# Patient Record
Sex: MALE | Race: WHITE | Employment: FULL TIME | URBAN - METROPOLITAN AREA
[De-identification: names, ages, dates, MRNs, and addresses within clinical notes are randomized per-mention and may not be internally consistent; named-entity substitution may affect disease eponyms.]

---

## 2022-05-24 ENCOUNTER — APPOINTMENT (OUTPATIENT)
Dept: CT IMAGING | Age: 32
End: 2022-05-24
Payer: COMMERCIAL

## 2022-05-24 ENCOUNTER — HOSPITAL ENCOUNTER (EMERGENCY)
Age: 32
Discharge: HOME | End: 2022-05-24
Payer: COMMERCIAL

## 2022-05-24 VITALS
BODY MASS INDEX: 25.2 KG/M2 | HEIGHT: 70 IN | HEART RATE: 96 BPM | OXYGEN SATURATION: 98 % | TEMPERATURE: 98.1 F | SYSTOLIC BLOOD PRESSURE: 149 MMHG | RESPIRATION RATE: 16 BRPM | DIASTOLIC BLOOD PRESSURE: 91 MMHG | WEIGHT: 176 LBS

## 2022-05-24 DIAGNOSIS — S09.90XA CLOSED HEAD INJURY, INITIAL ENCOUNTER: Primary | ICD-10-CM

## 2022-05-24 DIAGNOSIS — T07.XXXA ABRASIONS OF MULTIPLE SITES: ICD-10-CM

## 2022-05-24 PROCEDURE — 70450 CT HEAD/BRAIN W/O DYE: CPT

## 2022-05-24 PROCEDURE — 99284 EMERGENCY DEPT VISIT MOD MDM: CPT

## 2022-05-24 ASSESSMENT — PAIN SCALES - GENERAL: PAINLEVEL_OUTOF10: 3

## 2022-05-24 ASSESSMENT — PAIN - FUNCTIONAL ASSESSMENT: PAIN_FUNCTIONAL_ASSESSMENT: 0-10

## 2022-05-25 NOTE — ED NOTES
Pt instructed to follow up with PCP for wound recheck. Assessed per Marvin Carpenter LPN  17/55/88 8161

## 2022-05-25 NOTE — ED NOTES
Pt left ear wound applied with Petroleum adhesive/gauze turban wrap applied to keep dressing intact/PSO/adaptic/gauze/kerelx wrap applied to left hand.       Ford Stroud LPN  51/02/14 5210

## 2022-09-02 ENCOUNTER — APPOINTMENT (OUTPATIENT)
Dept: CT IMAGING | Age: 32
End: 2022-09-02
Payer: COMMERCIAL

## 2022-09-02 ENCOUNTER — HOSPITAL ENCOUNTER (EMERGENCY)
Age: 32
Discharge: HOME | End: 2022-09-02
Payer: COMMERCIAL

## 2022-09-02 VITALS
RESPIRATION RATE: 16 BRPM | TEMPERATURE: 98.6 F | DIASTOLIC BLOOD PRESSURE: 80 MMHG | OXYGEN SATURATION: 99 % | BODY MASS INDEX: 24.34 KG/M2 | WEIGHT: 170 LBS | SYSTOLIC BLOOD PRESSURE: 133 MMHG | HEIGHT: 70 IN | HEART RATE: 81 BPM

## 2022-09-02 DIAGNOSIS — K42.9 UMBILICAL HERNIA WITHOUT OBSTRUCTION AND WITHOUT GANGRENE: Primary | ICD-10-CM

## 2022-09-02 DIAGNOSIS — R10.33 PERIUMBILICAL ABDOMINAL PAIN: ICD-10-CM

## 2022-09-02 LAB
A/G RATIO: 2 (ref 1.1–2.2)
ALBUMIN SERPL-MCNC: 5 G/DL (ref 3.4–5)
ALP BLD-CCNC: 81 U/L (ref 40–129)
ALT SERPL-CCNC: 13 U/L (ref 10–40)
ANION GAP SERPL CALCULATED.3IONS-SCNC: 10 MMOL/L (ref 3–16)
AST SERPL-CCNC: 14 U/L (ref 15–37)
BASOPHILS ABSOLUTE: 0 K/UL (ref 0–0.2)
BASOPHILS RELATIVE PERCENT: 0.5 %
BILIRUB SERPL-MCNC: 0.6 MG/DL (ref 0–1)
BUN BLDV-MCNC: 11 MG/DL (ref 7–20)
CALCIUM SERPL-MCNC: 9.1 MG/DL (ref 8.3–10.6)
CHLORIDE BLD-SCNC: 102 MMOL/L (ref 99–110)
CO2: 27 MMOL/L (ref 21–32)
CREAT SERPL-MCNC: 0.9 MG/DL (ref 0.9–1.3)
EOSINOPHILS ABSOLUTE: 0.1 K/UL (ref 0–0.6)
EOSINOPHILS RELATIVE PERCENT: 1.5 %
GFR AFRICAN AMERICAN: >60
GFR NON-AFRICAN AMERICAN: >60
GLUCOSE BLD-MCNC: 101 MG/DL (ref 70–99)
HCT VFR BLD CALC: 45.6 % (ref 40.5–52.5)
HEMOGLOBIN: 15.7 G/DL (ref 13.5–17.5)
LIPASE: 23 U/L (ref 13–60)
LYMPHOCYTES ABSOLUTE: 1.8 K/UL (ref 1–5.1)
LYMPHOCYTES RELATIVE PERCENT: 24.1 %
MCH RBC QN AUTO: 31.8 PG (ref 26–34)
MCHC RBC AUTO-ENTMCNC: 34.4 G/DL (ref 31–36)
MCV RBC AUTO: 92.4 FL (ref 80–100)
MONOCYTES ABSOLUTE: 0.4 K/UL (ref 0–1.3)
MONOCYTES RELATIVE PERCENT: 5.1 %
NEUTROPHILS ABSOLUTE: 5.2 K/UL (ref 1.7–7.7)
NEUTROPHILS RELATIVE PERCENT: 68.8 %
PDW BLD-RTO: 13.2 % (ref 12.4–15.4)
PLATELET # BLD: 183 K/UL (ref 135–450)
PMV BLD AUTO: 8.4 FL (ref 5–10.5)
POTASSIUM REFLEX MAGNESIUM: 4 MMOL/L (ref 3.5–5.1)
RBC # BLD: 4.93 M/UL (ref 4.2–5.9)
SODIUM BLD-SCNC: 139 MMOL/L (ref 136–145)
TOTAL PROTEIN: 7.5 G/DL (ref 6.4–8.2)
WBC # BLD: 7.5 K/UL (ref 4–11)

## 2022-09-02 PROCEDURE — 99285 EMERGENCY DEPT VISIT HI MDM: CPT

## 2022-09-02 PROCEDURE — 83690 ASSAY OF LIPASE: CPT

## 2022-09-02 PROCEDURE — 80053 COMPREHEN METABOLIC PANEL: CPT

## 2022-09-02 PROCEDURE — 85025 COMPLETE CBC W/AUTO DIFF WBC: CPT

## 2022-09-02 PROCEDURE — 6360000004 HC RX CONTRAST MEDICATION: Performed by: NURSE PRACTITIONER

## 2022-09-02 PROCEDURE — 74177 CT ABD & PELVIS W/CONTRAST: CPT

## 2022-09-02 RX ADMIN — IOPAMIDOL 75 ML: 755 INJECTION, SOLUTION INTRAVENOUS at 16:16

## 2022-09-02 ASSESSMENT — PAIN DESCRIPTION - LOCATION: LOCATION: ABDOMEN

## 2022-09-02 ASSESSMENT — ENCOUNTER SYMPTOMS
COUGH: 0
VOMITING: 0
DIARRHEA: 0
ABDOMINAL PAIN: 1
SHORTNESS OF BREATH: 0
COLOR CHANGE: 0
BACK PAIN: 0
NAUSEA: 0
WHEEZING: 0

## 2022-09-02 ASSESSMENT — PAIN SCALES - GENERAL
PAINLEVEL_OUTOF10: 0
PAINLEVEL_OUTOF10: 2

## 2022-09-02 ASSESSMENT — PAIN - FUNCTIONAL ASSESSMENT
PAIN_FUNCTIONAL_ASSESSMENT: NONE - DENIES PAIN
PAIN_FUNCTIONAL_ASSESSMENT: 0-10

## 2022-09-02 NOTE — ED PROVIDER NOTES
**ADVANCED PRACTICE PROVIDER, I [facility]AL[facility]  ED  EMERGENCY DEPARTMENT ENCOUNTER      Pt Name: Gaylyn Paget  [facility] [place]:[id number]  [place] 1990  Date of evaluation: 9/2/2022  Provider: JANETH Stephenson CNP      Chief Complaint:    Chief Complaint   Patient presents with    Abdominal Pain     Pt with upper right side tenderness to touch         Nursing Notes, Past Medical Hx, Past Surgical Hx, Social Hx, Allergies, and Family Hx were all reviewed and agreed with or any disagreements were addressed in the HPI.    HPI: (Location, Duration, Timing, Severity, Quality, Assoc Sx, Context, Modifying factors)    Chief Complaint of umbilical abdominal pain    This is a  28 y.o. 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 However he denies any nausea vomiting or diarrhea. No cough, congestion, fever or chills, no chest pain put chest pain or shortness of breath. He states that at rest he does not have any discomfort however when he moves or strains at all the discomfort is about a 4 out of 10. He denies any constipation or blood in stool. He denies any additional complaints. No additional aggravating relieving factors. Patient presents awake, alert and in no acute respiratory distress or toxic appearance    PastMedical/Surgical History:  History reviewed. No pertinent past medical history. History reviewed. No pertinent surgical history. Medications:  Previous Medications    No medications on file         Review of Systems:  (2-9 systems needed)  Review of Systems   Constitutional:  Negative for chills and fever. HENT:  Negative for congestion. Respiratory:  Negative for cough, shortness of breath and wheezing. Cardiovascular:  Negative for chest pain. Gastrointestinal:  Positive for abdominal pain. Negative for diarrhea, nausea and vomiting. 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 However he denies any nausea vomiting or diarrhea. Genitourinary:  Negative for dysuria, frequency, hematuria and urgency. Musculoskeletal:  Negative for back pain. Skin:  Negative for color change. Neurological:  Negative for weakness, numbness and headaches. \"Positives and Pertinent negatives as per HPI\"    Physical Exam:  Physical Exam  Vitals and nursing note reviewed. Constitutional:       Appearance: He is well-developed. He is not diaphoretic. HENT:      Head: Normocephalic. Right Ear: External ear normal.      Left Ear: External ear normal.   Eyes:      General: No scleral icterus. Right eye: No discharge. Left eye: No discharge. Cardiovascular:      Rate and Rhythm: Normal rate. Comments: Normal S1 and 2, peripheral pulses are 2+, no edema observed  Pulmonary:      Effort: Pulmonary effort is normal. No respiratory distress. Comments: Airway patent with symmetric rise and fall of chest, lungs are clear anteriorly and posteriorly, patient is not tachypneic or dyspneic, saturations are 100% on room  Abdominal:      Palpations: Abdomen is soft. Tenderness: abdominal tenderness      Comments: Abdomen is flat soft nondistended. Bowel sounds are active, patient does have what appears to be an umbilical hernia just above the umbilical region as there is a slight bulge there that is tender to touch, I am unable to reduce it. No acute ascites or rigidity. No rebound tenderness at Dane's point, no Rasmussen sign. Musculoskeletal:         General: Normal range of motion. Cervical back: Normal range of motion and neck supple. Skin:     General: Skin is warm. Capillary Refill: Capillary refill takes less than 2 seconds. Coloration: Skin is not pale. Neurological:      General: No focal deficit present. Mental Status: He is alert and oriented to person, place, and time. GCS: GCS eye subscore is 4. GCS verbal subscore is 5. GCS motor subscore is 6. Psychiatric:         Behavior: Behavior normal.       MEDICAL DECISION MAKING    Vitals:    Vitals:    09/02/22 1453 09/02/22 1553 09/02/22 1645 09/02/22 1730   BP: (!) 146/93 (!) 138/96 118/77    Pulse: 84 86 85    Resp: 16 16 16    Temp: 98.8 °F (37.1 °C)      SpO2: 100% 98% 99% 99%   Weight: 170 lb (77.1 kg)          LABS:  Labs Reviewed   COMPREHENSIVE METABOLIC PANEL W/ REFLEX TO MG FOR LOW K - Abnormal; Notable for the following components:       Result Value    Glucose 101 (*)     AST 14 (*)     All other components within normal limits   CBC WITH AUTO DIFFERENTIAL   LIPASE   URINALYSIS WITH REFLEX TO CULTURE        Remainder of labs reviewed and were negative at this time or not returned at the time of this note. RADIOLOGY:   Non-plain film images such as CT, Ultrasound and MRI are read by the radiologist. Flory BAEZ APRN - CNP have directly visualized the radiologic plain film image(s) with the below findings:      Interpretation per the Radiologist below, if available at the time of this note:    CT ABDOMEN PELVIS W IV CONTRAST Additional Contrast? None   Preliminary Result   Tiny fat-filled umbilical hernia with mild adjacent inflammatory stranding. No drainable fluid collection or soft tissue gas. No acute intra-abdominal or intrapelvic abnormality identified.               MEDICAL DECISION MAKING / ED COURSE:    Because of high probability of sudden clinical deterioration of the patient's condition and risk of further deterioration, critical care time required my full attention to the patient's condition; which included chart data review, documentation, medication ordering, reviewing the patient's old records, reevaluation patient's cardiac, pulmonary and neurological status. Reevaluation of vital signs. Consultations with ED attending and admitting physician. Ordering, interpreting reviewing diagnostic testing. Therefore, I personally saw the patient and independently provided 13 minutes of non-concurrent critical care out of the total shared critical care time provided, direct attention to the patient's condition did not include time spent on procedures. 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 However he denies any nausea vomiting or diarrhea. After evaluation and examination the patient IV access, blood work, urinalysis and CT scan the abdomen ordered, patient declined any pain medicine at this time. CBC shows no sepsis or anemia. Metabolic panel shows no electrolyte disturbances or renal insufficiency. Liver functions and lipase are normal.  CT of the abdomen shows a tiny fat filled umbilical hernia with mild trace inflammatory stranding, no drainable fluid collection or soft tissue gas, no acute intra-abdominal or intrapelvic abnormality. I do believe this can be followed up on outpatient basis and do not think the patient needs admission to the hospital.  I educated him about avoiding any strenuous activities or lifting heavy as this could elicit worsening hernia, discomfort, I also believe that the patient can follow-up on outpatient basis with general surgery.   At this time no concerns for acute surgical abdomen, sepsis or other emergent etiology. Therefore, shared medical decision was made between the patient myself and we agreed the patient could be discharged home with outpatient follow-up. Patient was discharged home with referral back to PCP more portly needs to follow-up with general surgery in regards to the bilateral hernia. Educated avoid heavy lifting or strenuous exercises discussed elicit worsening discomfort. Return to the ER for any worsening or concerning symptoms. The patient tolerated their visit well. I evaluated the patient. The physician was available for consultation as needed. The patient and / or the family were informed of the results of any tests, a time was given to answer questions, a plan was proposed and they agreed with plan. Patient verbalized understanding of discharge instructions and was discharged from the department in stable condition. I am the Primary Clinician of Record. CLINICAL IMPRESSION:  1. Umbilical hernia without obstruction and without gangrene    2. Periumbilical abdominal pain        DISPOSITION Decision To Discharge 09/02/2022 05:58:29 PM      PATIENT REFERRED TO:  Duglas Parosns MD  [address] [place] [address]  Maximilian: [address] [place] [id number]      This is a general surgeon I would like you to follow-up with, please call in Monday morning and make an appointment for follow-up.     [facility]  [address] [facility] [address] [phone]  Go to   If symptoms worsen    DISCHARGE MEDICATIONS:  New Prescriptions    No medications on file       DISCONTINUED MEDICATIONS:  Discontinued Medications    No medications on file              (Please note the MDM and HPI sections of this note were completed with a voice recognition program.  Efforts were made to edit the dictations but occasionally words are mis-transcribed.)    Electronically signed, JANETH Ruvalcaba CNP,          JANETH Ruvalcaba CNP  09/02/22 [facility]

## 2022-09-06 ENCOUNTER — TELEPHONE (OUTPATIENT)
Dept: SURGERY | Age: 32
End: 2022-09-06

## 2022-09-06 ENCOUNTER — INITIAL CONSULT (OUTPATIENT)
Dept: SURGERY | Age: 32
End: 2022-09-06
Payer: COMMERCIAL

## 2022-09-06 VITALS
SYSTOLIC BLOOD PRESSURE: 133 MMHG | HEIGHT: 70 IN | WEIGHT: 160.4 LBS | DIASTOLIC BLOOD PRESSURE: 74 MMHG | TEMPERATURE: 98.3 F | HEART RATE: 100 BPM | BODY MASS INDEX: 22.96 KG/M2

## 2022-09-06 DIAGNOSIS — K43.9 EPIGASTRIC HERNIA: Primary | ICD-10-CM

## 2022-09-06 PROCEDURE — 99203 OFFICE O/P NEW LOW 30 MIN: CPT | Performed by: SURGERY

## 2022-09-06 RX ORDER — SODIUM CHLORIDE 9 MG/ML
INJECTION, SOLUTION INTRAVENOUS PRN
Status: CANCELLED | OUTPATIENT
Start: 2022-09-06

## 2022-09-06 RX ORDER — SODIUM CHLORIDE 0.9 % (FLUSH) 0.9 %
5-40 SYRINGE (ML) INJECTION PRN
Status: CANCELLED | OUTPATIENT
Start: 2022-09-06

## 2022-09-06 RX ORDER — SODIUM CHLORIDE 0.9 % (FLUSH) 0.9 %
5-40 SYRINGE (ML) INJECTION EVERY 12 HOURS SCHEDULED
Status: CANCELLED | OUTPATIENT
Start: 2022-09-06

## 2022-09-06 NOTE — TELEPHONE ENCOUNTER
Pt had called and said he would like a revised 'Return to Work' letter stating that he was able to go back to work today after his consult with Dr. Valentin Le 9/6 - 9/21 with no restrictions. He said it would be great if you could email it to him tomorrow sometime 9/7. He would greatly appreciate it! His Email: [email or web address]  Thank you very much!

## 2022-09-06 NOTE — TELEPHONE ENCOUNTER
PT called in stating that him and Dr. Zachery Dhillon decided that he is fine to go back to work prior to surgery. Pt stated that he is having trouble with his work because his paperwork from the ER states he is not able to go back to work till he does has surgery, so he is not sure what to do since Dr. Zachery Dhillon advised him to return to work till surgery. Please call pt when you get a chance.  [phone]

## 2022-09-07 ENCOUNTER — TELEPHONE (OUTPATIENT)
Dept: SURGERY | Age: 32
End: 2022-09-07

## 2022-09-07 NOTE — PATIENT INSTRUCTIONS
[address] [address]  Phone: [phone]  Fax: [phone] will be scheduled for surgery with Dr. Gosia Yi. The office will call you with the date and time that surgery is scheduled. Please take note of these instructions for surgery: You should have nothing by mouth after midnight the night before your surgery - this includes no food or water. Your surgery will be cancelled if you have taken anything by mouth after midnight, NO exceptions. You will need to have a history and physical prior to your surgery. This will need to be completed up to 30 days before your surgery. This H/P can be completed by your family doctor or the hospital.   IF you take coumadin (warfarin), please stop taking this medication 5 days prior to your surgery. IF you take plavix, please stop taking this 7 days prior to your surgery. Please contact our office if you have a pacemaker or defibrillator. IF you are allergic to latex, please tell our office prior to your surgery. This is important in know before scheduling your surgery. IF you are having an out patient surgery, you will need someone available to drive you home after your surgery, and to also stay with you for the rest of the day. IF you are having a surgery requiring an inpatient stay in the hospital, you will need someone to drive you home upon discharge from the hospital.  Please contact Dr. Clinton Staton assistant Shannon Cantor if you have any questions or concerns. Please call the office with any changes in your symptoms or further questions/concerns.  [phone]

## 2022-09-07 NOTE — PROGRESS NOTES
April Sickle    Age 28 y.o.    male    1990    MRN [id number]    9/22/2022  Arrival Time_____________  OR Time____________[address]     Procedure(s):  OPEN EPIGASTRIC HERNIA REPAIR, POSSIBLE MESH                      Monitor Anesthesia Care   Surgeon(s):  Ramon Walker, MD      DAY ADMIT ___  SDS/OP ___  OUTPT IN BED ___        Phone [phone] (home)     PCP _____________________ Phone_________________ Epic ( ) Epic CE ( ) Appt ________    ADDITIONAL INFO __________________________________ Cardio/Consult _____________    NOTES _____________________________________________________________________    ____________________________________________________________________________    PAT APPT DATE:________ TIME: ________  FAXED QAD: _______  (__) H&P w/ Hospitalist  __________________________________________________________________________  Preop Nurse phone screen complete: _____________  (__) CBC     (__) W/ DIFF ___________     (__) Hgb A1C    ___________  (__) CHEST X RAY   __________  (__) LIPID PROFILE  ___________  (__) EKG   __________  (__) PT/PTT   ___________  (__) PFT's   __________  (__) BMP   ___________  (__) CAROTIDS  __________  (__) CMP   ___________  (__) VEIN MAPPING  __________  (__) U/A   ___________  (__) HISTORY & PHYSICAL __________  (__) URINE C & S  ___________  (__) CARDIAC CLEARANCE __________  (__) U/A W/ FLEX  ___________  (__) PULM.  CLEARANCE __________  (__) SERUM PREGNANCY ___________  (__) Check Epic DOS orders __________  (__) TYPE & SCREEN __________repeat ( ) (__)  __________________ __________  (__) ALBUMIN Victor Hugo Bolus ___________  (__)  __________________ __________  (__) TRANSFERRIN  ___________  (__)  __________________ __________  (__) LIVER PROFILE  ___________  (__)  __________________ __________  (__) MRSA NASAL SWAB ___________  (__) URINE PREG DOS __________  (__) SED RATE  ___________  (__) BLOOD SUGAR DOS __________  (__) C-REACTIVE PROTEIN ___________    (__) VITAMIN D HYDROXY ___________  (__) BLOOD THINNERS __________    (__) ACE/ ARBS: _____________________     (__) BETABLOCKERS __________________

## 2022-09-07 NOTE — TELEPHONE ENCOUNTER
[place] Work Comp Claim  DOI: 8/25/22  Claim # [id number]  Pt saw Dr Brennan Winston in the office 9/6/22 and was given surgery instructions for an Open Epigastric Hernia Repair, Possible Mesh (MAC) scheduled 9/22/22 @ 10:30am arrival 8:30am [facility] - NPO after midnight gilda b/f surgery - Pt will need  day of surgery - Pre-op physical needed - Pt understood and agreed w/ above noted

## 2022-09-07 NOTE — PROGRESS NOTES
New Patient [address] Surgery Edson Hands JACOB Horton, [address] [facility], [address] [place] [address], [address] [place] [address]  Phone: [phone]  Fax: [phone]    Kj Marroquin   YOB: 1990    Date of Visit:  9/6/2022    No ref. provider found  No primary care provider on file. HPI:   Hernia: Patient is 28y.o. year old male seen at request of No primary care provider on file. .  Patient presents for evaluation of  epigastric hernia, tender. Symptoms were first noted 2 weeks ago. Pain is sharp, intermittent. There is a lump or mass present just above his umbilicus. Lump is not reducible. Pt does not have risk factors of  chronic constipation, chronic cough, difficulty urinating,but he does smoke daily. Pt. does not have previous history of prior hernia surgery. Patient states he lifted a heavy object at work and felt a sudden sharp pain followed by the lump above the umbilicus. No Known Allergies  No outpatient medications have been marked as taking for the 9/6/22 encounter (Initial consult) with Ulysses Paganini, MD.       No past medical history on file. No past surgical history on file. No family history on file.   Social History     Socioeconomic History    Marital status: Single     Spouse name: Not on file    Number of children: Not on file    Years of education: Not on file    Highest education level: Not on file   Occupational History    Not on file   Tobacco Use    Smoking status: Every Day     Packs/day: 1.00     Types: Cigarettes    Smokeless tobacco: Current     Types: Chew   Substance and Sexual Activity    Alcohol use: Yes     Comment: weekly    Drug use: Never    Sexual activity: Not on file   Other Topics Concern    Not on file   Social History Narrative    Not on file     Social Determinants of Health     Financial Resource Strain: Not on file   Food Insecurity: Not on file   Transportation Needs: Not on file   Physical Activity: Not on file Stress: Not on file   Social Connections: Not on file   Intimate Partner Violence: Not on file   Housing Stability: Not on file          A review of the patient's record including allergies, medication list, tobacco history, family history, problem list, medical history and social history has been completed and updates made to the patient's EMR where indicated. Vitals:    09/06/22 1123   BP: 133/74   Site: Right Wrist   Position: Sitting   Cuff Size: Medium Adult   Pulse: 100   Temp: 98.3 °F (36.8 °C)   Weight: 160 lb 6.4 oz (72.8 kg)   Height: 5' 10\" (1.778 m)     Body mass index is 23.02 kg/m². Wt Readings from Last 3 Encounters:   09/06/22 160 lb 6.4 oz (72.8 kg)   09/02/22 170 lb (77.1 kg)   05/24/22 176 lb (79.8 kg)     BP Readings from Last 3 Encounters:   09/06/22 133/74   09/02/22 133/80   05/24/22 (!) 149/91          REVIEW OF SYSTEMS:   All other systems reviewed; please refer to HPI with pertinent positives, all other ROS are negative    PHYSICAL EXAM:    CONSTITUTIONAL:  awake, alert, no apparent distress and normal weight  ENT:  normocepalic, without obvious abnormality  NECK:  supple, symmetrical, trachea midline   LUNGS:  Resp easy and unlabored  CARDIOVASCULAR:    ABDOMEN:  no scars, soft, non-distended, tenderness noted in the epigastric region, involuntary guarding absent,  Examination for hernias: epigastric hernia, incarcerated, tender located just above the umbilicus in the midline, no erythema. MUSCULOSKELETAL: No edema  NEUROLOGIC:  Mental Status Exam:  Level of Alertness:   awake  Orientation:   person, place, time      DATA:      ASSESSMENT:     Diagnosis Orders   1. Epigastric hernia              PLAN:    We will schedule the pt for open epigastric hernia repair, likely via primary closure. The technical aspects, risks, benefits and complications of the procedure were discussed with the patient.  The pt appears to understand, asks appropriate questions, and agrees to proceed

## 2022-09-16 NOTE — PROGRESS NOTES
1. Do not eat or drink anything after 12 midnight prior to surgery. This includes no water, chewing gum mints, or ice chips. You may brush your teeth and gargle the day of surgery but DO NOT SWALLOW THE WATER. 2. Please see your family doctor/pediatrician for a history and physical and/or concerning medications. Bring any test results/reports from your physician's office. If you are under the care of a heart doctor or specialist please be aware that you may be asked to see him or her for clearance. 3. You may be asked to stop blood thinners such as Coumadin, Plavix, Fragmin, and Lovenox or Anti-inflammatories such as Aspirin, Ibuprofen, Advil, and Naproxen prior to your surgery. Please check with your doctor before stopping these or any other medications. 4. Do not smoke, and do not drink any alcoholic beverages 24 hours prior to surgery. 5. You MUST make arrangements for a responsible adult to take you home after your surgery. For your safety, you will not be allowed to leave alone or drive yourself home. Your surgery will be cancelled if you do not have a ride home. Also for your safety, it is strongly suggested someone stay with you the first 24 hrs after your surgery. 6. A parent/legal guardian must accompany a child scheduled for surgery and plan to stay at the hospital until the child is discharged. Please do not bring other children with you. 7. For your comfort,please wear simple, loose fitting clothing to the hospital.  Please do not bring valuables (money, credit cards, checkbooks, etc.) Do not wear any makeup (including no eye makeup) or nail polish on your fingers or toes. 8. For your safety, please DO NOT wear any jewelry or piercings on day of surgery. All body piercing jewelry must be removed. 9. If you have dentures, they will be removed before going to the OR; for your convenience we will provide you with a container.   If you wear contact lenses or glasses, they will be removed, they will be removed, please bring a case for them. 10. If appicable,Please see your family doctor/pediatrician for a history & physical and/or concerning medications. Bring any test results/reports from your physician's office. 11. Remember to bring Blood Bank bracelet to the hospital on the day of surgery. 12. If you have a Living Will and Durable Power of  for Healthcare, please bring in a copy. 15. Notify your Surgeon if you develop any illness between now and surgery  time, cough, cold, fever, sore throat, nausea, vomiting, etc.  Please notify your surgeon if you experience dizziness, shortness of breath or blurred vision between now & the time of your surgery   14. DO NOT shave your operative site 96 hours prior to surgery. For face & neck surgery, men may use an electric razor 48 hours prior to surgery. 15. Shower the night before surgery with ___Antibacterial soap ___Hibiclens   16. To provide excellent care visitors will be limited to one in the room at any given time. 17.  Please bring picture ID and insurance card. 18.  Visit our web site for additional information:  [employer] [facility]/surgery.

## 2022-09-20 ENCOUNTER — TELEPHONE (OUTPATIENT)
Dept: SURGERY | Age: 32
End: 2022-09-20

## 2022-09-22 NOTE — PROGRESS NOTES
Spoke with pt and updated date and time of surgery. Understanding verbalized. No change in health history. Pt states has not had H&P yet but will be going to urgent care and will bring copy with him DOS. Understands surgery can not be done without H&P. States no need to review pre op instructions again.

## 2022-09-29 ENCOUNTER — HOSPITAL ENCOUNTER (OUTPATIENT)
Age: 32
Setting detail: OUTPATIENT SURGERY
Discharge: HOME | End: 2022-09-29
Attending: SURGERY | Admitting: SURGERY
Payer: COMMERCIAL

## 2022-09-29 ENCOUNTER — ANESTHESIA (OUTPATIENT)
Dept: OPERATING ROOM | Age: 32
End: 2022-09-29
Payer: COMMERCIAL

## 2022-09-29 ENCOUNTER — ANESTHESIA EVENT (OUTPATIENT)
Dept: OPERATING ROOM | Age: 32
End: 2022-09-29
Payer: COMMERCIAL

## 2022-09-29 VITALS
RESPIRATION RATE: 12 BRPM | HEIGHT: 70 IN | BODY MASS INDEX: 23.19 KG/M2 | HEART RATE: 68 BPM | OXYGEN SATURATION: 100 % | WEIGHT: 162 LBS | SYSTOLIC BLOOD PRESSURE: 106 MMHG | DIASTOLIC BLOOD PRESSURE: 85 MMHG | TEMPERATURE: 97 F

## 2022-09-29 PROBLEM — K43.9 EPIGASTRIC HERNIA: Status: ACTIVE | Noted: 2022-09-29

## 2022-09-29 PROCEDURE — 7100000010 HC PHASE II RECOVERY - FIRST 15 MIN: Performed by: SURGERY

## 2022-09-29 PROCEDURE — 7100000001 HC PACU RECOVERY - ADDTL 15 MIN: Performed by: SURGERY

## 2022-09-29 PROCEDURE — 7100000000 HC PACU RECOVERY - FIRST 15 MIN: Performed by: SURGERY

## 2022-09-29 PROCEDURE — 3600000013 HC SURGERY LEVEL 3 ADDTL 15MIN: Performed by: SURGERY

## 2022-09-29 PROCEDURE — 6360000002 HC RX W HCPCS: Performed by: REGISTERED NURSE

## 2022-09-29 PROCEDURE — 7100000011 HC PHASE II RECOVERY - ADDTL 15 MIN: Performed by: SURGERY

## 2022-09-29 PROCEDURE — 3600000003 HC SURGERY LEVEL 3 BASE: Performed by: SURGERY

## 2022-09-29 PROCEDURE — 2580000003 HC RX 258: Performed by: REGISTERED NURSE

## 2022-09-29 PROCEDURE — 3700000001 HC ADD 15 MINUTES (ANESTHESIA): Performed by: SURGERY

## 2022-09-29 PROCEDURE — 2500000003 HC RX 250 WO HCPCS: Performed by: REGISTERED NURSE

## 2022-09-29 PROCEDURE — 2500000003 HC RX 250 WO HCPCS: Performed by: SURGERY

## 2022-09-29 PROCEDURE — 49000 EXPLORATION OF ABDOMEN: CPT | Performed by: SURGERY

## 2022-09-29 PROCEDURE — 3700000000 HC ANESTHESIA ATTENDED CARE: Performed by: SURGERY

## 2022-09-29 PROCEDURE — 2709999900 HC NON-CHARGEABLE SUPPLY: Performed by: SURGERY

## 2022-09-29 RX ORDER — SODIUM CHLORIDE, SODIUM LACTATE, POTASSIUM CHLORIDE, CALCIUM CHLORIDE 600; 310; 30; 20 MG/100ML; MG/100ML; MG/100ML; MG/100ML
INJECTION, SOLUTION INTRAVENOUS CONTINUOUS
Status: DISCONTINUED | OUTPATIENT
Start: 2022-09-29 | End: 2022-09-29 | Stop reason: HOSPADM

## 2022-09-29 RX ORDER — SODIUM CHLORIDE 0.9 % (FLUSH) 0.9 %
5-40 SYRINGE (ML) INJECTION PRN
Status: DISCONTINUED | OUTPATIENT
Start: 2022-09-29 | End: 2022-09-29 | Stop reason: HOSPADM

## 2022-09-29 RX ORDER — HYDRALAZINE HYDROCHLORIDE 20 MG/ML
10 INJECTION INTRAMUSCULAR; INTRAVENOUS
Status: DISCONTINUED | OUTPATIENT
Start: 2022-09-29 | End: 2022-09-29 | Stop reason: HOSPADM

## 2022-09-29 RX ORDER — PROPOFOL 10 MG/ML
INJECTION, EMULSION INTRAVENOUS CONTINUOUS PRN
Status: DISCONTINUED | OUTPATIENT
Start: 2022-09-29 | End: 2022-09-29 | Stop reason: SDUPTHER

## 2022-09-29 RX ORDER — DIPHENHYDRAMINE HYDROCHLORIDE 50 MG/ML
12.5 INJECTION INTRAMUSCULAR; INTRAVENOUS
Status: DISCONTINUED | OUTPATIENT
Start: 2022-09-29 | End: 2022-09-29 | Stop reason: HOSPADM

## 2022-09-29 RX ORDER — MIDAZOLAM HYDROCHLORIDE 1 MG/ML
INJECTION INTRAMUSCULAR; INTRAVENOUS PRN
Status: DISCONTINUED | OUTPATIENT
Start: 2022-09-29 | End: 2022-09-29 | Stop reason: SDUPTHER

## 2022-09-29 RX ORDER — SODIUM CHLORIDE 0.9 % (FLUSH) 0.9 %
5-40 SYRINGE (ML) INJECTION EVERY 12 HOURS SCHEDULED
Status: DISCONTINUED | OUTPATIENT
Start: 2022-09-29 | End: 2022-09-29 | Stop reason: HOSPADM

## 2022-09-29 RX ORDER — SODIUM CHLORIDE, SODIUM LACTATE, POTASSIUM CHLORIDE, CALCIUM CHLORIDE 600; 310; 30; 20 MG/100ML; MG/100ML; MG/100ML; MG/100ML
INJECTION, SOLUTION INTRAVENOUS CONTINUOUS PRN
Status: DISCONTINUED | OUTPATIENT
Start: 2022-09-29 | End: 2022-09-29 | Stop reason: SDUPTHER

## 2022-09-29 RX ORDER — PROCHLORPERAZINE EDISYLATE 5 MG/ML
5 INJECTION INTRAMUSCULAR; INTRAVENOUS
Status: DISCONTINUED | OUTPATIENT
Start: 2022-09-29 | End: 2022-09-29 | Stop reason: HOSPADM

## 2022-09-29 RX ORDER — SODIUM CHLORIDE 9 MG/ML
INJECTION, SOLUTION INTRAVENOUS PRN
Status: DISCONTINUED | OUTPATIENT
Start: 2022-09-29 | End: 2022-09-29 | Stop reason: HOSPADM

## 2022-09-29 RX ORDER — FENTANYL CITRATE 50 UG/ML
INJECTION, SOLUTION INTRAMUSCULAR; INTRAVENOUS PRN
Status: DISCONTINUED | OUTPATIENT
Start: 2022-09-29 | End: 2022-09-29 | Stop reason: SDUPTHER

## 2022-09-29 RX ORDER — EPHEDRINE SULFATE 50 MG/ML
INJECTION INTRAVENOUS PRN
Status: DISCONTINUED | OUTPATIENT
Start: 2022-09-29 | End: 2022-09-29 | Stop reason: SDUPTHER

## 2022-09-29 RX ORDER — PROPOFOL 10 MG/ML
INJECTION, EMULSION INTRAVENOUS PRN
Status: DISCONTINUED | OUTPATIENT
Start: 2022-09-29 | End: 2022-09-29 | Stop reason: SDUPTHER

## 2022-09-29 RX ORDER — ONDANSETRON 2 MG/ML
INJECTION INTRAMUSCULAR; INTRAVENOUS PRN
Status: DISCONTINUED | OUTPATIENT
Start: 2022-09-29 | End: 2022-09-29 | Stop reason: SDUPTHER

## 2022-09-29 RX ORDER — MEPERIDINE HYDROCHLORIDE 50 MG/ML
12.5 INJECTION INTRAMUSCULAR; INTRAVENOUS; SUBCUTANEOUS AS NEEDED
Status: DISCONTINUED | OUTPATIENT
Start: 2022-09-29 | End: 2022-09-29 | Stop reason: HOSPADM

## 2022-09-29 RX ORDER — ONDANSETRON 2 MG/ML
4 INJECTION INTRAMUSCULAR; INTRAVENOUS
Status: DISCONTINUED | OUTPATIENT
Start: 2022-09-29 | End: 2022-09-29 | Stop reason: HOSPADM

## 2022-09-29 RX ADMIN — DEXMEDETOMIDINE HYDROCHLORIDE 20 MCG: 100 INJECTION, SOLUTION INTRAVENOUS at 15:40

## 2022-09-29 RX ADMIN — MIDAZOLAM HYDROCHLORIDE 2 MG: 2 INJECTION, SOLUTION INTRAMUSCULAR; INTRAVENOUS at 15:40

## 2022-09-29 RX ADMIN — EPHEDRINE SULFATE 10 MG: 50 INJECTION INTRAVENOUS at 16:22

## 2022-09-29 RX ADMIN — ONDANSETRON 4 MG: 2 INJECTION INTRAMUSCULAR; INTRAVENOUS at 15:40

## 2022-09-29 RX ADMIN — FENTANYL CITRATE 100 MCG: 50 INJECTION INTRAMUSCULAR; INTRAVENOUS at 15:40

## 2022-09-29 RX ADMIN — PROPOFOL 100 MG: 10 INJECTION, EMULSION INTRAVENOUS at 15:50

## 2022-09-29 RX ADMIN — SODIUM CHLORIDE, SODIUM LACTATE, POTASSIUM CHLORIDE, AND CALCIUM CHLORIDE: .6; .31; .03; .02 INJECTION, SOLUTION INTRAVENOUS at 15:40

## 2022-09-29 RX ADMIN — DEXMEDETOMIDINE HYDROCHLORIDE 20 MCG: 100 INJECTION, SOLUTION INTRAVENOUS at 15:49

## 2022-09-29 RX ADMIN — PROPOFOL 100 MCG/KG/MIN: 10 INJECTION, EMULSION INTRAVENOUS at 15:50

## 2022-09-29 ASSESSMENT — PAIN - FUNCTIONAL ASSESSMENT: PAIN_FUNCTIONAL_ASSESSMENT: NONE - DENIES PAIN

## 2022-09-29 NOTE — DISCHARGE INSTRUCTIONS
[facility] SURGERY [facility] AND [facility]. Althea Mccurdy M.D. [address] [facility] [address] [place]                2054 Tyron Sanchez M.D. [address] [place] [address], [address] [place] [address]         [place] [address] [facility] Chano Moran M.D                         [phone]          [facility] Nadir Mason M.D. [facility]                                                        POST-OPERATIVE INSTRUCTIONS HERNIA REPAIR    Call the office to schedule your post-operative appointment with your surgeon for two (2) weeks. Iyou have water-proof surgical glue over your incision    Place an ice pack over your incisions on and off (15min) at a time for the next 2 days. General guidelines for activity:      Avoid strenuous activity or lifting anything heavier than 15 pounds. It is OK to be up and walking around. Going up and down stairs is   also OK. Do what is comfortable: stop and rest when you feel tired. You will have pain medicine ordered. Take as directed. Do NOT drive for one week and while taking your narcotic pain medicine. Watch for signs of infection:    Excessive warmth or bright redness around your incisions    Leakage of bloody or cloudy fluid from you incisions    Fever over 100.5    If you experience constipation  Increase your water intake. Increase your activity; walking is best.  A stool softener or mild laxative may be necessary if you still have not had a bowel  movement ; call the office for further instructions. Please take note: IF you do not take all of your narcotic pain medication, we ask that you dispose of these responsibly. Drug drop off boxes are located in the [facility] and [facility] emergency departments.    These Med Safe return oral cabinets are available 24/7 in the emergency department [facility] office staff may NOT accept any medications to drop off in the cabinet. PATIENT INSTRUCTIONS  POST-ANESTHESIA    IMMEDIATELY FOLLOWING SURGERY:  Do not drive or operate machinery for the first twenty four hours after surgery. Do not make any important decisions for twenty four hours after surgery or while taking narcotic pain medications or sedatives. If you develop intractable nausea and vomiting or a severe headache please notify your doctor immediately. FOLLOW-UP:  Please make an appointment with your surgeon as instructed. You do not need to follow up with anesthesia unless specifically instructed to do so. WOUND CARE INSTRUCTIONS (if applicable):  Keep a dry clean dressing on the anesthesia/puncture wound site if there is drainage. Once the wound has quit draining you may leave it open to air. Generally you should leave the bandage intact for twenty four hours unless there is drainage. If the epidural site drains for more than 36-48 hours please call the anesthesia department. QUESTIONS?:  Please feel free to call your physician or the hospital  if you have any questions, and they will be happy to assist you.

## 2022-09-29 NOTE — PROGRESS NOTES
Discharge instructions reviewed and copy given to family, verbalizes understanding. IV removed and pt transported via wheelchair to private vehicle.

## 2022-09-29 NOTE — OP NOTE
Operative Note      Patient: Khadra Leigh  YOB: 1990  MRN: [id number]    Date of Procedure: 9/29/2022    Pre-Op Diagnosis: EPIGASTRIC HERNIA    Post-Op Diagnosis:  normal epigastric fascia, no hernia       Procedure(s):  ABDOMINAL WALL EXPLORATION    Surgeon(s):  Cain Ann MD    Assistant:   Surgical Assistant: Cynthia Hess    Anesthesia: Monitor Anesthesia Care    Estimated Blood Loss (mL): Minimal    Complications: None    Specimens:   * No specimens in log *    Implants:  * No implants in log *      Drains: * No LDAs found *    Findings: no fascial defect noted in the supraumbilical midline region where previously-noted palpable \"lump\" had been noted on exam.  Mildly thickened/scarred subcutaneous fat at area of \"lump\", excised    Detailed Description of Procedure:   See dictated note    Job#: [id number]

## 2022-09-29 NOTE — BRIEF OP NOTE
Brief Postoperative Note      Patient: Guerline Griffith  YOB: 1990  MRN: [id number]    Date of Procedure: 9/29/2022    Pre-Op Diagnosis: EPIGASTRIC HERNIA    Post-Op Diagnosis:  Abdominal Pain        Procedure(s):  ABDOMINAL WALL EXPLORATION    Surgeon(s):  MD Man Hall DO PGY4    Assistant:  Surgical Assistant: Cathy Hunter    Anesthesia: Monitor Anesthesia Care    Estimated Blood Loss (mL): Minimal    Complications: None    Specimens: None    Implants: None      Drains: None    Findings: Fat shaved off of fascia without evidence of fascial defect. Surrounding abdominal wall explored and confirmed to be without hernia defect.  Incision closed with deep 3-0 vicryl and subcuticular 4-0 monocryl     Electronically signed by Man Dempsey DO on 9/29/2022 at 4:35 PM

## 2022-09-29 NOTE — H&P
I have reviewed the history and physical and examined the patient. I find no relevant changes. I have reviewed with the patient and/or family members, during the preoperative office visit the risks, benefits, and alternatives to the procedure.     Guy Jimenez MD

## 2022-09-29 NOTE — ANESTHESIA PRE PROCEDURE
Department of Anesthesiology  Preprocedure Note       Name:  Christiano Gan   Age:  28 y.o.  :  1990                                          MRN:  [id number]         Date:  2022      Surgeon: Amaury Michele):  Iram Tsai MD    Procedure: Procedure(s):  OPEN EPIGASTRIC HERNIA REPAIR, POSSIBLE MESH    Medications prior to admission:   Prior to Admission medications    Not on File       Current medications:    Current Facility-Administered Medications   Medication Dose Route Frequency Provider Last Rate Last Admin    sodium chloride flush 0.9 % injection 5-40 mL  5-40 mL IntraVENous 2 times per day Iram Tsai MD        sodium chloride flush 0.9 % injection 5-40 mL  5-40 mL IntraVENous PRN Iram Tsai MD        0.9 % sodium chloride infusion   IntraVENous PRN Iram Tsai MD           Allergies:  No Known Allergies    Problem List:  There is no problem list on file for this patient.       Past Medical History:        Diagnosis Date    Asthma     as child; no problems in years       Past Surgical History:        Procedure Laterality Date    WISDOM TOOTH EXTRACTION         Social History:    Social History     Tobacco Use    Smoking status: Every Day     Packs/day: 1.00     Types: Cigarettes    Smokeless tobacco: Current     Types: Chew    Tobacco comments:     Nicotine pouch to help wean off tobacco   Substance Use Topics    Alcohol use: Yes     Comment: beers 3x weekly                                Ready to quit: Not Answered  Counseling given: Not Answered  Tobacco comments: Nicotine pouch to help wean off tobacco      Vital Signs (Current):   Vitals:    22 1501 22 1101   BP:  137/87   Pulse:  68   Resp:  16   Temp:  98.4 °F (36.9 °C)   TempSrc:  Temporal   SpO2:  100%   Weight: 162 lb (73.5 kg)    Height: 5' 10\" (1.778 m)                                               BP Readings from Last 3 Encounters:   22 137/87   22 133/74   22 133/80 NPO Status: Time of last liquid consumption: 1900                        Time of last solid consumption: 2200                        Date of last liquid consumption: 09/28/22                        Date of last solid food consumption: 09/28/22    BMI:   Wt Readings from Last 3 Encounters:   09/16/22 162 lb (73.5 kg)   09/06/22 160 lb 6.4 oz (72.8 kg)   09/02/22 170 lb (77.1 kg)     Body mass index is 23.24 kg/m². CBC:   Lab Results   Component Value Date/Time    WBC 7.5 09/02/2022 03:30 PM    RBC 4.93 09/02/2022 03:30 PM    HGB 15.7 09/02/2022 03:30 PM    HCT 45.6 09/02/2022 03:30 PM    MCV 92.4 09/02/2022 03:30 PM    RDW 13.2 09/02/2022 03:30 PM     09/02/2022 03:30 PM       CMP:   Lab Results   Component Value Date/Time     09/02/2022 03:30 PM    K 4.0 09/02/2022 03:30 PM     09/02/2022 03:30 PM    CO2 27 09/02/2022 03:30 PM    BUN 11 09/02/2022 03:30 PM    CREATININE 0.9 09/02/2022 03:30 PM    GFRAA >60 09/02/2022 03:30 PM    AGRATIO 2.0 09/02/2022 03:30 PM    LABGLOM >60 09/02/2022 03:30 PM    GLUCOSE 101 09/02/2022 03:30 PM    PROT 7.5 09/02/2022 03:30 PM    CALCIUM 9.1 09/02/2022 03:30 PM    BILITOT 0.6 09/02/2022 03:30 PM    ALKPHOS 81 09/02/2022 03:30 PM    AST 14 09/02/2022 03:30 PM    ALT 13 09/02/2022 03:30 PM       POC Tests: No results for input(s): POCGLU, POCNA, POCK, POCCL, POCBUN, POCHEMO, POCHCT in the last 72 hours.     Coags: No results found for: PROTIME, INR, APTT    HCG (If Applicable): No results found for: PREGTESTUR, PREGSERUM, HCG, HCGQUANT     ABGs: No results found for: PHART, PO2ART, EJQ9MNR, BMG1YSD, BEART, L2MPDYFP     Type & Screen (If Applicable):  No results found for: LABABO, LABRH    Drug/Infectious Status (If Applicable):  No results found for: HIV, HEPCAB    COVID-19 Screening (If Applicable): No results found for: COVID19        Anesthesia Evaluation  Patient summary reviewed and Nursing notes reviewed  Airway: Mallampati: II  TM distance: >3 FB Neck ROM: full  Mouth opening: > = 3 FB   Dental: normal exam         Pulmonary:normal exam    (+) asthma:                            Cardiovascular:Negative CV ROS                      Neuro/Psych:   Negative Neuro/Psych ROS              GI/Hepatic/Renal: Neg GI/Hepatic/Renal ROS            Endo/Other: Negative Endo/Other ROS                    Abdominal:             Vascular: negative vascular ROS. Other Findings:           Anesthesia Plan      MAC     ASA 2       Induction: intravenous. MIPS: Postoperative opioids intended. Anesthetic plan and risks discussed with patient. Plan discussed with CRNA.     Attending anesthesiologist reviewed and agrees with Preprocedure content                ROYA Cruz MD   9/29/2022

## 2022-09-30 ENCOUNTER — SCHEDULED TELEPHONE ENCOUNTER (OUTPATIENT)
Dept: SURGERY | Age: 32
End: 2022-09-30

## 2022-09-30 DIAGNOSIS — Z09 POSTOP CHECK: Primary | ICD-10-CM

## 2022-09-30 PROCEDURE — 99024 POSTOP FOLLOW-UP VISIT: CPT | Performed by: SURGERY

## 2022-09-30 NOTE — ANESTHESIA POSTPROCEDURE EVALUATION
Department of Anesthesiology  Postprocedure Note    Patient: Cyn Card  MRN: [id number]  [place]: 1990  Date of evaluation: 9/29/2022      Procedure Summary     Date: 09/29/22 Room / Location: [address] [place] [address]    Anesthesia Start: 3628 Anesthesia Stop: 1640    Procedure: ABDOMINAL WALL EXPLORATION (Abdomen) Diagnosis:       Epigastric hernia      (EPIGASTRIC HERNIA)    Surgeons: Avinash Edwards MD Responsible Provider: Jay Abdullahi MD    Anesthesia Type: MAC ASA Status: 2          Anesthesia Type: MAC    Jacinto Phase I: Jacinto Score: 9    Jacinto Phase II: Jacinto Score: 10      Anesthesia Post Evaluation    Patient location during evaluation: PACU  Patient participation: complete - patient participated  Level of consciousness: awake and alert  Pain score: 0  Airway patency: patent  Nausea & Vomiting: no nausea and no vomiting  Complications: no  Cardiovascular status: hemodynamically stable  Respiratory status: acceptable  Hydration status: stable

## 2022-10-03 ENCOUNTER — TELEPHONE (OUTPATIENT)
Dept: SURGERY | Age: 32
End: 2022-10-03

## 2022-10-05 ENCOUNTER — TELEPHONE (OUTPATIENT)
Dept: SURGERY | Age: 32
End: 2022-10-05

## 2022-10-05 NOTE — OP NOTE
[place]                                OPERATIVE REPORT    PATIENT NAME: Andrea Torres                     :        1990  MED REC NO:   [id number]                          ROOM:  ACCOUNT NO:   [de-identified]                           ADMIT DATE: 2022  PROVIDER:     Manisha Christie MD    DATE OF PROCEDURE:  2022    PREOPERATIVE DIAGNOSIS:  Epigastric hernia. POSTOPERATIVE DIAGNOSIS:  Normal epigastric fascia, no hernia. OPERATION PERFORMED:  Abdominal wall exploration. SURGEON:  MD LAKISHA FregosoTajeanmarie Ayala DO    ANESTHESIA:  General.    ESTIMATED BLOOD LOSS:  Less than 50 mL. SPECIMEN:  None. SPONGE AND NEEDLE COUNT:  Correct. INDICATIONS:  The patient is a 70-year-old male with complaints of lump  in the supraumbilical midline just above the umbilicus with a palpable  lump on exam that was deemed consistent with a small epigastric hernia  or supraumbilical hernia. He did have a CT scan done previously that  suggested a visible umbilical hernia defect, but no defect was noted  above the umbilicus. There was no bulging within the base of his  umbilicus, typical of a true umbilical hernia. With the palpable mass,  I felt this was again a very small epigastric fascial defect with a  small amount of incarcerated preperitoneal or falciform ligament fat. I  offered him open exploration and repair of the small fascial defect. FINDINGS:  No fascial defect noted in the supraumbilical midline region  where previously noted palpable \"lump\" had been noted on exam.  Mildly  thickened and scarred subcutaneous fat was appreciated in the area of  lump and this was all excised. DESCRIPTION OF PROCEDURE:  After informed consent was obtained, the  patient was taken to the operating room and placed in the supine  position. Athrombic pumps were placed on the legs.   General anesthesia  was administered without difficulty. The abdomen was prepped and draped  in a sterile fashion. The palpable lump had been noted in the preop  area on the day of surgery had been marked and was located approximately  1 to 2 cm above the umbilicus. A 2 cm incision overlying this area of  the lump was infiltrated with local anesthesia and opened in a vertical  orientation. Dissection was carried down through subcutaneous fat. I  explored the area and never found any typical abnormal fat that would be  consistent with herniating through a fascial defect. There was some  scarring of the subcutaneous fat that was appreciated during the  dissection and this was all sharply excised to allow me to expose the  midline fascia further. I continued probing for any small fascial  defects in this area, but did not find any. I dissected off the midline  in both directions and above and below my area of incision including  exploring down towards the umbilicus. Nowhere along this area did I  find a fascial defect that would require a repair. After this extensive  exploration and examination for defect, I decided to complete the  procedure with the presumption that the scarred subcutaneous fat may  have been what he and we were feeling on exam.  The wound was checked  for hemostasis. The incision was closed with 3-0 Vicryl subcutaneous  sutures followed by a 4-0 Monocryl subcuticular stitch and Dermabond. The patient was then awakened and taken to the recovery room in stable  condition.         Nazia Rodríguez MD    D: 10/04/2022 9:29:18       T: 10/04/2022 10:18:16     [id number]  Job#: [id number]     Doc#: [id number]    CC:

## 2022-10-05 NOTE — TELEPHONE ENCOUNTER
Pt called and said he had epigastric hernia repair on 9/29 by Dr. Dionicio Dexter. He said it is more raised now than before sx, it's very firm around the edges of the site, noticeably larger, subtle redness, and it hurts to lay down & sit up. He had to take off of work today because of it. Please call him and thank you!

## 2022-10-07 ENCOUNTER — OFFICE VISIT (OUTPATIENT)
Dept: SURGERY | Age: 32
End: 2022-10-07

## 2022-10-07 ENCOUNTER — TELEPHONE (OUTPATIENT)
Dept: SURGERY | Age: 32
End: 2022-10-07

## 2022-10-07 VITALS
DIASTOLIC BLOOD PRESSURE: 73 MMHG | SYSTOLIC BLOOD PRESSURE: 133 MMHG | HEIGHT: 70 IN | HEART RATE: 99 BPM | BODY MASS INDEX: 23.05 KG/M2 | WEIGHT: 161 LBS

## 2022-10-07 DIAGNOSIS — Z09 POSTOP CHECK: Primary | ICD-10-CM

## 2022-10-07 PROCEDURE — 99024 POSTOP FOLLOW-UP VISIT: CPT | Performed by: SURGERY

## 2022-10-07 NOTE — TELEPHONE ENCOUNTER
Pt called saying he is concerned that he has a lot of swelling around incision and developed a fever a few nights ago - Pt states he has not had another fever since other than a low-grade temp of 99.8 but he does have intermittent pain - I explained that Dr Andrew Banegas will take a look at it today and assess at his OV - Pt understood and agreed

## 2022-10-08 ENCOUNTER — HOSPITAL ENCOUNTER (EMERGENCY)
Age: 32
Discharge: HOME | End: 2022-10-08
Attending: EMERGENCY MEDICINE
Payer: COMMERCIAL

## 2022-10-08 VITALS
BODY MASS INDEX: 23.05 KG/M2 | HEART RATE: 79 BPM | DIASTOLIC BLOOD PRESSURE: 81 MMHG | SYSTOLIC BLOOD PRESSURE: 132 MMHG | WEIGHT: 161 LBS | OXYGEN SATURATION: 98 % | TEMPERATURE: 97.9 F | HEIGHT: 70 IN | RESPIRATION RATE: 16 BRPM

## 2022-10-08 DIAGNOSIS — T81.41XA INFECTION OF SUPERFICIAL INCISIONAL SURGICAL SITE AFTER PROCEDURE, INITIAL ENCOUNTER: Primary | ICD-10-CM

## 2022-10-08 DIAGNOSIS — L02.211 ABDOMINAL WALL ABSCESS: ICD-10-CM

## 2022-10-08 LAB
A/G RATIO: 1.3 (ref 1.1–2.2)
ALBUMIN SERPL-MCNC: 3.9 G/DL (ref 3.4–5)
ALP BLD-CCNC: 65 U/L (ref 40–129)
ALT SERPL-CCNC: 12 U/L (ref 10–40)
ANION GAP SERPL CALCULATED.3IONS-SCNC: 11 MMOL/L (ref 3–16)
AST SERPL-CCNC: 13 U/L (ref 15–37)
BASOPHILS ABSOLUTE: 0 K/UL (ref 0–0.2)
BASOPHILS RELATIVE PERCENT: 0.2 %
BILIRUB SERPL-MCNC: 0.3 MG/DL (ref 0–1)
BUN BLDV-MCNC: 13 MG/DL (ref 7–20)
CALCIUM SERPL-MCNC: 9 MG/DL (ref 8.3–10.6)
CHLORIDE BLD-SCNC: 101 MMOL/L (ref 99–110)
CO2: 26 MMOL/L (ref 21–32)
CREAT SERPL-MCNC: 0.8 MG/DL (ref 0.9–1.3)
EOSINOPHILS ABSOLUTE: 0.1 K/UL (ref 0–0.6)
EOSINOPHILS RELATIVE PERCENT: 0.8 %
GFR AFRICAN AMERICAN: >60
GFR NON-AFRICAN AMERICAN: >60
GLUCOSE BLD-MCNC: 96 MG/DL (ref 70–99)
HCT VFR BLD CALC: 40 % (ref 40.5–52.5)
HEMOGLOBIN: 13.8 G/DL (ref 13.5–17.5)
LACTIC ACID: 1.5 MMOL/L (ref 0.4–2)
LYMPHOCYTES ABSOLUTE: 0.9 K/UL (ref 1–5.1)
LYMPHOCYTES RELATIVE PERCENT: 12.4 %
MCH RBC QN AUTO: 31.2 PG (ref 26–34)
MCHC RBC AUTO-ENTMCNC: 34.5 G/DL (ref 31–36)
MCV RBC AUTO: 90.4 FL (ref 80–100)
MONOCYTES ABSOLUTE: 0.5 K/UL (ref 0–1.3)
MONOCYTES RELATIVE PERCENT: 7 %
NEUTROPHILS ABSOLUTE: 5.7 K/UL (ref 1.7–7.7)
NEUTROPHILS RELATIVE PERCENT: 79.6 %
PDW BLD-RTO: 12.7 % (ref 12.4–15.4)
PLATELET # BLD: 181 K/UL (ref 135–450)
PMV BLD AUTO: 7.9 FL (ref 5–10.5)
POTASSIUM SERPL-SCNC: 4.3 MMOL/L (ref 3.5–5.1)
RBC # BLD: 4.42 M/UL (ref 4.2–5.9)
SODIUM BLD-SCNC: 138 MMOL/L (ref 136–145)
TOTAL PROTEIN: 6.9 G/DL (ref 6.4–8.2)
WBC # BLD: 7.2 K/UL (ref 4–11)

## 2022-10-08 PROCEDURE — 83605 ASSAY OF LACTIC ACID: CPT

## 2022-10-08 PROCEDURE — 99283 EMERGENCY DEPT VISIT LOW MDM: CPT

## 2022-10-08 PROCEDURE — 87070 CULTURE OTHR SPECIMN AEROBIC: CPT

## 2022-10-08 PROCEDURE — 6370000000 HC RX 637 (ALT 250 FOR IP): Performed by: EMERGENCY MEDICINE

## 2022-10-08 PROCEDURE — 87077 CULTURE AEROBIC IDENTIFY: CPT

## 2022-10-08 PROCEDURE — 87205 SMEAR GRAM STAIN: CPT

## 2022-10-08 PROCEDURE — 80053 COMPREHEN METABOLIC PANEL: CPT

## 2022-10-08 PROCEDURE — 85025 COMPLETE CBC W/AUTO DIFF WBC: CPT

## 2022-10-08 PROCEDURE — 87186 SC STD MICRODIL/AGAR DIL: CPT

## 2022-10-08 PROCEDURE — 10060 I&D ABSCESS SIMPLE/SINGLE: CPT

## 2022-10-08 RX ORDER — ONDANSETRON 4 MG/1
4 TABLET, ORALLY DISINTEGRATING ORAL EVERY 8 HOURS PRN
Qty: 20 TABLET | Refills: 0 | Status: SHIPPED | OUTPATIENT
Start: 2022-10-08

## 2022-10-08 RX ORDER — SULFAMETHOXAZOLE AND TRIMETHOPRIM 800; 160 MG/1; MG/1
1 TABLET ORAL 2 TIMES DAILY
Qty: 20 TABLET | Refills: 0 | Status: SHIPPED | OUTPATIENT
Start: 2022-10-08 | End: 2022-10-18

## 2022-10-08 RX ORDER — HYDROCODONE BITARTRATE AND ACETAMINOPHEN 5; 325 MG/1; MG/1
1 TABLET ORAL EVERY 8 HOURS PRN
Qty: 8 TABLET | Refills: 0 | Status: SHIPPED | OUTPATIENT
Start: 2022-10-08 | End: 2022-10-11

## 2022-10-08 RX ORDER — CEPHALEXIN 250 MG/1
500 CAPSULE ORAL ONCE
Status: COMPLETED | OUTPATIENT
Start: 2022-10-08 | End: 2022-10-08

## 2022-10-08 RX ORDER — SULFAMETHOXAZOLE AND TRIMETHOPRIM 800; 160 MG/1; MG/1
1 TABLET ORAL ONCE
Status: COMPLETED | OUTPATIENT
Start: 2022-10-08 | End: 2022-10-08

## 2022-10-08 RX ORDER — CEPHALEXIN 500 MG/1
500 CAPSULE ORAL 3 TIMES DAILY
Qty: 30 CAPSULE | Refills: 0 | Status: SHIPPED | OUTPATIENT
Start: 2022-10-08 | End: 2022-10-18

## 2022-10-08 RX ADMIN — SULFAMETHOXAZOLE AND TRIMETHOPRIM 1 TABLET: 800; 160 TABLET ORAL at 08:32

## 2022-10-08 RX ADMIN — CEPHALEXIN 500 MG: 250 CAPSULE ORAL at 08:32

## 2022-10-08 ASSESSMENT — PAIN SCALES - GENERAL
PAINLEVEL_OUTOF10: 0
PAINLEVEL_OUTOF10: 6

## 2022-10-08 ASSESSMENT — ENCOUNTER SYMPTOMS
ABDOMINAL PAIN: 1
SORE THROAT: 0
DIARRHEA: 0
SHORTNESS OF BREATH: 0
VOMITING: 0
BACK PAIN: 0
NAUSEA: 0
CONSTIPATION: 1
COLOR CHANGE: 1

## 2022-10-08 ASSESSMENT — PAIN - FUNCTIONAL ASSESSMENT
PAIN_FUNCTIONAL_ASSESSMENT: 0-10
PAIN_FUNCTIONAL_ASSESSMENT: NONE - DENIES PAIN

## 2022-10-08 ASSESSMENT — PAIN DESCRIPTION - LOCATION: LOCATION: ABDOMEN

## 2022-10-08 NOTE — Clinical Note
Bryce Ruiz was seen and treated in our emergency department on 10/8/2022. He may return to work on 10/11/2022. Make sure to follow up with general surgery for clearance      If you have any questions or concerns, please don't hesitate to call.       Peg Jennings MD

## 2022-10-08 NOTE — ED PROVIDER NOTES
[address] [facility]  ED  EMERGENCY DEPARTMENT ENCOUNTER        Pt Name: Brittany Guillen  MRN: [id number]  [place] 1990  Date of evaluation: 10/8/2022  Provider: Anthony Tracy MD  PCP: No primary care provider on file. CHIEF COMPLAINT       Chief Complaint   Patient presents with    Post-op Problem     Hernia surgery 9/29. Noticed blood from incision this morning       HISTORY OFPRESENT ILLNESS   (Location/Symptom, Timing/Onset, Context/Setting, Quality, Duration, Modifying Factors,Severity)  Note limiting factors. Brittany Guillen is a 28 y.o. male presenting today due to concern for having surgery September 29 by Dr. Althea Mccurdy for umbilical hernia repair and having some mild persistent discomfort ever since and even seeing his surgeon yesterday and was told at that point it was healing appropriately but then he woke up this morning with some drainage which was new and concerned him so he came to the ED for further evaluation. He denies any chest pain or shortness of breath. He denies any deeper abdominal pain or vomiting or diarrhea although he feels slightly constipated. He denies any urinary complaints. He denies any radiation of the pain to his back. He denies smelling any foul-smelling discharge since yesterday. Due to concern for the discharge this morning, he came to the ED for further assessment. He denies any falls or trauma. He takes Tylenol for pain at home but was hoping for something stronger as well in case he needs it. REVIEW OF SYSTEMS    (2-9 systems for level 4, 10 or more for level 5)     Review of Systems   Constitutional:  Negative for chills, diaphoresis, fatigue and fever. HENT:  Negative for sore throat. Respiratory:  Negative for shortness of breath. Cardiovascular:  Negative for chest pain. Gastrointestinal:  Positive for abdominal pain (at site of incision only) and constipation (mild, BM every other day).  Negative for diarrhea, nausea and vomiting. Genitourinary:  Negative for dysuria and flank pain. Musculoskeletal:  Negative for back pain. Skin:  Positive for color change (erythema at site of incision) and wound (from surgery). Neurological:  Negative for weakness and headaches. Psychiatric/Behavioral:  The patient is not nervous/anxious. Positives and Pertinent negatives as per HPI. PASTMEDICAL HISTORY     Past Medical History:   Diagnosis Date    Asthma     as child; no problems in years         SURGICAL HISTORY       Past Surgical History:   Procedure Laterality Date    UMBILICAL HERNIA REPAIR N/A 9/29/2022    ABDOMINAL WALL EXPLORATION performed by Darryl Richard MD at [address]       Discharge Medication List as of 10/8/2022 11:33 AM          ALLERGIES     Patient has no known allergies.     FAMILY HISTORY       Family History   Problem Relation Age of Onset    No Known Problems Mother     No Known Problems Father           SOCIAL HISTORY       Social History     Socioeconomic History    Marital status: Single     Spouse name: None    Number of children: None    Years of education: None    Highest education level: None   Tobacco Use    Smoking status: Every Day     Packs/day: 1.00     Types: Cigarettes    Smokeless tobacco: Former     Types: Chew    Tobacco comments:     Nicotine pouch to help wean off tobacco   Vaping Use    Vaping Use: Never used   Substance and Sexual Activity    Alcohol use: Yes     Comment: beers 3x weekly    Drug use: Never       SCREENINGS    [place] Coma Scale  Eye Opening: Spontaneous  Best Verbal Response: Oriented  Best Motor Response: Obeys commands  [place] Coma Scale Score: 15           PHYSICAL EXAM    (up to 7 for level 4, 8 or more for level 5)     ED Triage Vitals [10/08/22 0640]   BP Temp Temp Source Heart Rate Resp SpO2 Height Weight   (!) 150/100 97.9 °F (36.6 °C) Oral (!) 102 18 100 % 5' 10\" (1.778 m) 161 lb (73 kg) Physical Exam  Vitals and nursing note reviewed. Constitutional:       General: He is awake. He is not in acute distress. Appearance: Normal appearance. He is well-developed, well-groomed and normal weight. He is not ill-appearing, toxic-appearing or diaphoretic. Interventions: He is not intubated. HENT:      Head: Normocephalic and atraumatic. Right Ear: External ear normal.      Left Ear: External ear normal.      Nose: Nose normal.      Mouth/Throat:      Mouth: Mucous membranes are moist.   Eyes:      General:         Right eye: No discharge. Left eye: No discharge. Neck:      Trachea: Trachea and phonation normal. No tracheal deviation. Cardiovascular:      Rate and Rhythm: Normal rate and regular rhythm. Pulses: Normal pulses. Pulmonary:      Effort: Pulmonary effort is normal. No tachypnea, bradypnea, accessory muscle usage, prolonged expiration, respiratory distress or retractions. He is not intubated. Breath sounds: Normal air entry. Abdominal:      General: Abdomen is flat. Bowel sounds are normal. There is no distension. Palpations: Abdomen is soft. Tenderness: There is abdominal tenderness (at site of incision only) in the periumbilical area. There is no guarding or rebound. Hernia: No hernia is present. Musculoskeletal:         General: No tenderness, deformity or signs of injury. Normal range of motion. Cervical back: Full passive range of motion without pain, normal range of motion and neck supple. No edema, erythema, signs of trauma, rigidity, torticollis or crepitus. No pain with movement, spinous process tenderness or muscular tenderness. Normal range of motion. Skin:     General: Skin is warm and dry. Coloration: Skin is not jaundiced or pale. Findings: Erythema and wound (from surgery) present. No rash. Neurological:      General: No focal deficit present.       Mental Status: He is alert and oriented to person, place, and time. Mental status is at baseline. GCS: GCS eye subscore is 4. GCS verbal subscore is 5. GCS motor subscore is 6. Sensory: Sensation is intact. No sensory deficit. Motor: Motor function is intact. No weakness, tremor, atrophy, abnormal muscle tone or seizure activity. Psychiatric:         Attention and Perception: Attention normal.         Mood and Affect: Mood and affect normal. Mood is not anxious. Speech: Speech normal. Speech is not delayed or slurred. Behavior: Behavior normal. Behavior is cooperative. DIAGNOSTIC RESULTS   :    Labs Reviewed   CULTURE, WOUND - Abnormal; Notable for the following components:       Result Value    Gram Stain Result   (*)     Value: 1+ WBC's (Polymorphonuclear)  2+ Gram positive cocci      Organism Staphylococcus aureus (*)     All other components within normal limits    Narrative:     ORDER#: [id number]                          ORDERED BY: JANNETH LAUREN  SOURCE: Abscess                            COLLECTED:  10/08/22 08:40  ANTIBIOTICS AT [facility].:                      RECEIVED :  10/08/22 16:26   CBC WITH AUTO DIFFERENTIAL - Abnormal; Notable for the following components:    Hematocrit 40.0 (*)     Lymphocytes Absolute 0.9 (*)     All other components within normal limits   COMPREHENSIVE METABOLIC PANEL - Abnormal; Notable for the following components:    Creatinine 0.8 (*)     AST 13 (*)     All other components within normal limits   LACTIC ACID       All other labs were within normal range or not returned asof this dictation. EKG:  All EKG's are interpreted by the Emergency Department Physician who either signs or Co-signs this chart in the absence of a cardiologist.        RADIOLOGY:   Non-plain film images such as CT, Ultrasound and MRI are read by the radiologist. Plainradiographic images are visualized and preliminarily interpreted by the  ED Provider with the belowfindings:        Interpretation per the Radiologist below, if available at the time of this note:    No orders to display         PROCEDURES   Unless otherwise noted below, none     Incision/Drainage    Date/Time: 10/9/2022 2:48 PM  Performed by: Joan Benítez MD  Authorized by: Joan Benítez MD     Consent:     Consent obtained:  Verbal    Consent given by:  Patient    Risks, benefits, and alternatives were discussed: yes      Risks discussed:  Bleeding, incomplete drainage, pain, infection and damage to other organs    Alternatives discussed:  No treatment, referral and alternative treatment  Universal protocol:     Procedure explained and questions answered to patient or proxy's satisfaction: yes      Patient identity confirmed:  Verbally with patient  Location:     Type:  Surgical wound infection    Size:  5 by 5 cm    Location:  Trunk    Trunk location:  Abdomen (at surgical site near umbilicus)  Pre-procedure details:     Skin preparation:  Povidone-iodine  Sedation:     Sedation type:  None  Anesthesia:     Anesthesia method:  Local infiltration    Local anesthetic:  Lidocaine 2% w/o epi  Procedure type:     Complexity:  Simple  Procedure details:     Surgical wound: surgical wound reopened      Ultrasound guidance: yes      Needle aspiration: no      Incision types:  Single straight and stab incision (initial did stab, but patient reported it was not numb enough, so did repeat local and then went slightly lower vertical incision measuring roughly 1 cm)    Incision depth:  Subcutaneous    Wound management:  Probed and deloculated    Drainage:  Purulent and bloody    Drainage amount:  Copious (significant purulent fluid noted)    Wound treatment:  Wound left open    Packing materials:  1/2 in gauze    Amount 1/2\":  12 inches  Post-procedure details:     Procedure completion:  Tolerated well, no immediate complications    Ultrasound assisted procedure  Procedure note:  Indication:post-surgical infection with concern for abscess,     Billable study: no    Views:  Long access view of target: Adequate  Short access view of target: Adequate        Images obtained with findings:   Visualized pocket of fluid with no color doppler signal in area roughly 1.5 cm deep and therefore this was concerning for abscess and no concern for vessel in this region, I did not see any signs of peristalsis in this area and this did appear to be superficial fluid collection but on ultrasound and no bowel involvement     Impression:   Abscess present at post-surgical site, superficial     Images obtained by Amie La, interpreted by Amie La. Images were NOT saved to ultrasound machine. CRITICAL CARE TIME   N/A    CONSULTS: Spoke with Dr. Loraine Connor at 2128 and he reported that this is most likely this can just wait until Monday in the office and no need for CT or further imaging at this point. After talking to him, I did do a bedside ultrasound and saw significant pocket of purulence just deep to the surgical wound concerning for abscess. I did call him back at 0901 and he reviewed the surgical note and states that there was no mesh placed and this is almost like just doing a typical abscess and that there would be no additional complications if I were to drain the wound at the bedside. He recommended I do this and that he would not be able to come see the patient at this point, and based on his recommendation and bedside ultrasound, I did feel it is reasonable. The patient did give verbal consent for procedure as well following my discussion with Dr. Loraine Connor.       IP CONSULT TO GENERAL SURGERY    EMERGENCY DEPARTMENT COURSE and DIFFERENTIAL DIAGNOSIS/MDM:   Vitals:    Vitals:    10/08/22 0730 10/08/22 0800 10/08/22 0825 10/08/22 1101   BP: 121/77 124/78  132/81   Pulse: 92 86 80 79   Resp: 14 11 12 16   Temp:       TempSrc:       SpO2: 100% 98% 96% 98%   Weight:       Height:           Patient was given the following medications:  Medications sulfamethoxazole-trimethoprim (BACTRIM DS;SEPTRA DS) 800-160 MG per tablet 1 tablet (1 tablet Oral Given 10/8/22 2923)   cephALEXin (KEFLEX) capsule 500 mg (500 mg Oral Given 10/8/22 7916)       Patient was evaluated due to developing some drainage this morning when he woke up that he thought was bloody although on exam appeared to be bloody/purulent for me associated with some abdominal discomfort. He denies any vomiting or fever today although did have a fever 3 days ago. He did follow-up with his surgeon yesterday and at that time was told everything looked okay but the drainage started today. We will do basic labs but otherwise I will plan to consult general surgery to see if they want to come evaluate the patient in the emergency department or if they would prefer a CT scan at this point. Initially, they reported just placed the patient on antibiotics and they would see him on Monday. I did then do a bedside ultrasound showing concern for pocket of fluid that was superficial concerning for postsurgical wound abscess. I then called surgery back he did recommend drainage at the bedside by myself and that I would not cause any additional postsurgical complication since there is no mesh or other challenges related to the type of surgery done based on surgical note. I was able to drain the wound with copious purulence with some blood mixed within. I made my incision very superficial but did use hemostats to break up loculations. The patient overall tolerated the procedure well. He was started on Bactrim and Keflex and wound culture was sent for further evaluation of the infection.   He knows to call general surgery for urgent appointment in 2 days on Monday to ensure his wound is improving but if he worsens prior to that with redness spreading, development of fever, vomiting, or increasing pain, then return immediately to the emergency department for repeat assessment and possible admission for IV antibiotics. He was well-appearing and in no acute distress at time of discharge and felt comfortable with this plan. Since he only had pain directly at the surgical site, I do not suspect intra-abdominal infection or other complication at this point and therefore did not do a CT at this time, but again did give very strict return precautions for any worsening symptoms to return immediately to the ED for further assessment if any changes prior to Monday (2 days from now). I was the primary provider for the patient. The patient tolerated their visit well. The patient and / or the family were informed of the results of any tests, a time was given to answer questions. FINAL IMPRESSION      1. Infection of superficial incisional surgical site after procedure, initial encounter    2. Abdominal wall abscess          DISPOSITION/PLAN   DISPOSITION Decision To Discharge 10/08/2022 10:56:34 AM-Discharged in improved, stable condition      PATIENT REFERRED TO:  [facility]  ED  [address]  Go to   If symptoms worsen    Ko Moy MD  [address] [address] [address], [address] [facility]  [phone]    Schedule an appointment as soon as possible for a visit in 2 days  For wound re-check    DISCHARGEMEDICATIONS:  Discharge Medication List as of 10/8/2022 11:33 AM        START taking these medications    Details   cephALEXin (KEFLEX) 500 MG capsule Take 1 capsule by mouth 3 times daily for 10 days, Disp-30 capsule, R-0Print      sulfamethoxazole-trimethoprim (BACTRIM DS) 800-160 MG per tablet Take 1 tablet by mouth 2 times daily for 10 days, Disp-20 tablet, R-0Print      HYDROcodone-acetaminophen (NORCO) 5-325 MG per tablet Take 1 tablet by mouth every 8 hours as needed for Pain (breakthrough pain, do not drive with this) for up to 3 days. , Disp-8 tablet, R-0Print      ondansetron (ZOFRAN ODT) 4 MG disintegrating tablet Take 1 tablet by mouth every 8 hours as needed for Nausea, Disp-20 tablet, R-0Print             DISCONTINUED MEDICATIONS:  Discharge Medication List as of 10/8/2022 11:33 AM                 (Please note that portions of this note were completed with a voicerecognition program.  Efforts were made to edit the dictations but occasionally words are mis-transcribed.)    Kristi Johnson MD (electronically signed)            Kristi Johnson MD  10/09/22 9008

## 2022-10-08 NOTE — CONSULTS
1057 - PS to Dr Sada Barajas at [facility] general surgery  Re: umbilical hernia  5024 - 2nd PS since no call back   0545 - Dr Sada Barajas called back to speak with Dr Arin Bustamante

## 2022-10-08 NOTE — DISCHARGE INSTRUCTIONS
Take Tylenol or Motrin as needed for pain. Take Norco for breakthrough pain but do not drive with this understanding can be addictive. Take Zofran for nausea. Take Bactrim and Keflex due to concern for infection. Follow-up with your general surgeon in 2 days for repeat evaluation and wound check. Return to the emergency department in the next 12 to 24 hours for any worsening abdominal pain with vomiting, fever, redness spreading outside of the lines, or any other concerns.

## 2022-10-08 NOTE — ED NOTES
Wound is traced over redness area with skin marker. Pt has approx 3cc of pink/red tinged fluid coming from opening. Area is cleaned up and Dr Stephen Vasquez is updated.       Carlito Francis RN  10/08/22 9781

## 2022-10-08 NOTE — Clinical Note
Amanda Romero was seen and treated in our emergency department on 10/8/2022. He may return to work on 10/11/2022. Make sure to follow up with general surgery for clearance      If you have any questions or concerns, please don't hesitate to call.       Jose Thompson MD

## 2022-10-10 ENCOUNTER — TELEPHONE (OUTPATIENT)
Dept: SURGERY | Age: 32
End: 2022-10-10

## 2022-10-10 LAB
GRAM STAIN RESULT: ABNORMAL
ORGANISM: ABNORMAL
WOUND/ABSCESS: ABNORMAL

## 2022-10-10 NOTE — TELEPHONE ENCOUNTER
PT stated he had surgery last Thursday with Dr. Huemra Veliz  on 9/29 and he woke up Saturday and stated his incision was bleeding (dripping)  went to ER 10/8/22 and they did an US and found an abscess and drained it & packed it. ER stated for him to follow up with us. PT is scheduled for tomorrow 10/11/22 at 2:15.

## 2022-10-11 ENCOUNTER — OFFICE VISIT (OUTPATIENT)
Dept: SURGERY | Age: 32
End: 2022-10-11

## 2022-10-11 VITALS
BODY MASS INDEX: 23.19 KG/M2 | DIASTOLIC BLOOD PRESSURE: 72 MMHG | WEIGHT: 162 LBS | HEIGHT: 70 IN | SYSTOLIC BLOOD PRESSURE: 136 MMHG | HEART RATE: 96 BPM

## 2022-10-11 DIAGNOSIS — T81.49XA ABSCESS OF POSTOPERATIVE WOUND OF ABDOMINAL WALL: Primary | ICD-10-CM

## 2022-10-11 PROBLEM — L02.211 ABSCESS OF POSTOPERATIVE WOUND OF ABDOMINAL WALL: Status: ACTIVE | Noted: 2022-10-11

## 2022-10-11 PROCEDURE — 99024 POSTOP FOLLOW-UP VISIT: CPT | Performed by: SURGERY

## 2022-10-11 NOTE — PATIENT INSTRUCTIONS
Continue with routine wound care as discussed  Gradually increase activities as tolerated  Follow-up in 1 week or as needed; please call with questions or concerns

## 2022-10-11 NOTE — PROGRESS NOTES
Surgery Post-op Progress Note    HPI:  Notes reviewed, and agree with documentation in pt's chart. Postoperative Follow-up: Patient presents for 1 week follow-up status post abdominal wall exploration for presumed epigastric hernia. No hernia defect was found, some mildly thickened subcutaneous fat was excised from the area . Eating a regular diet without difficulty. Bowel movements are Normal.      Pt has noted mild swelling, hardness and pain at the site of surgery. No redness or drainage noted. .     ROS:    10 point review of systems performed; please refer to HPI with pertinent positives, all other ROS are negative    A review of the patient's record including allergies, medication list, tobacco history, family history, problem list, medical history and social history has been completed and updates made to the patient's EMR where indicated. PE:   CONSTITUTIONAL:  awake and alert    ABDOMEN: soft, non-distended, tenderness noted at incision     INCISION: clean, dry, no drainage, ~5cm induration overlying incision site, healing      ASSESSMENT:   Diagnosis Orders   1. Postop check        Likely has a hematoma/seroma due to the extensive dissection I did while looking for the presumed fascial defect.       PLAN:    Continue with routine wound care as discussed; ok to apply to heat to swelling, as this should help the seroma/hematoma resorb over next 1-2 weeks  Gradually increase activities as tolerated  Follow-up next week or as needed; please call with questions or concerns

## 2022-10-11 NOTE — PROGRESS NOTES
Surgery Post-op Progress Note    HPI:  Notes reviewed, and agree with documentation in pt's chart. Postoperative Follow-up: Patient presents for 2 week follow-up status post abdominal wall exploration for presumed epigastric hernia. No hernia defect was found, some mildly thickened subcutaneous fat was excised from the area. At last visit, had mild swelling and induration without erythema at the incision, felt this was likely seroma/hematoma. Planned to follow up again next week. Over weekend pt noted increased swelling, redness and drainage from incision. Seen in ED 2 nights ago, had I&D done with release of purulent fluid  - cultures (+) MSSA. Packing placed. Pt notes significant improvement in pain, and redness has faded. Still w/ some drainage via the packing. No fevers/chills. ROS:    10 point review of systems performed; please refer to HPI with pertinent positives, all other ROS are negative    A review of the patient's record including allergies, medication list, tobacco history, family history, problem list, medical history and social history has been completed and updates made to the patient's EMR where indicated. PE:   CONSTITUTIONAL:  awake and alert    ABDOMEN: firm, non-distended, non-tender     INCISION: supraumbilical incision w/ ~3WK stab incision from recent drainage w/ packing. No surrounding erythema. Packing removed - no further drainage appreciated w/ palpation. ASSESSMENT:   Diagnosis Orders   1.  Abscess of postoperative wound of abdominal wall        Packing d/c'd, no further packing needed      PLAN:    Continue with routine wound care as discussed; continue antibiotics as well  Gradually increase activities as tolerated  Follow-up in 1 week or as needed; please call with questions or concerns

## 2022-10-18 ENCOUNTER — OFFICE VISIT (OUTPATIENT)
Dept: SURGERY | Age: 32
End: 2022-10-18

## 2022-10-18 VITALS
HEIGHT: 70 IN | HEART RATE: 87 BPM | DIASTOLIC BLOOD PRESSURE: 70 MMHG | BODY MASS INDEX: 23.22 KG/M2 | SYSTOLIC BLOOD PRESSURE: 144 MMHG | WEIGHT: 162.2 LBS

## 2022-10-18 DIAGNOSIS — Z09 POSTOP CHECK: Primary | ICD-10-CM

## 2022-10-18 PROCEDURE — 99024 POSTOP FOLLOW-UP VISIT: CPT | Performed by: SURGERY

## 2022-10-20 NOTE — PROGRESS NOTES
Patient presents for 3 week follow-up status post abdominal wall exploration for presumed epigastric hernia. Postop course notable for incisional seroma/hematoma. Since last visit he has noted steady improvement in the swelling and discomfort at the incision.     Exam: nearly resolved induration and swelling at supraumbilical incision, no erythema, non-tener    Impression:  resolving postop hematoma/seroma      Plan:  Continue with routine wound care as discussed  Gradually increase activities as tolerated  Follow-up as needed; please call with questions or concerns

## 2022-11-21 ENCOUNTER — OFFICE VISIT (OUTPATIENT)
Dept: INTERNAL MEDICINE CLINIC | Age: 32
End: 2022-11-21

## 2022-11-21 VITALS — HEIGHT: 70 IN | BODY MASS INDEX: 23.19 KG/M2 | WEIGHT: 162 LBS

## 2022-11-21 DIAGNOSIS — Z00.00 ROUTINE GENERAL MEDICAL EXAMINATION AT A HEALTH CARE FACILITY: ICD-10-CM

## 2022-11-21 DIAGNOSIS — R21 PENILE RASH: ICD-10-CM

## 2022-11-21 DIAGNOSIS — Z00.00 ROUTINE GENERAL MEDICAL EXAMINATION AT A HEALTH CARE FACILITY: Primary | ICD-10-CM

## 2022-11-21 PROCEDURE — 99385 PREV VISIT NEW AGE 18-39: CPT | Performed by: INTERNAL MEDICINE

## 2022-11-21 RX ORDER — CLOTRIMAZOLE AND BETAMETHASONE DIPROPIONATE 10; .64 MG/G; MG/G
CREAM TOPICAL
Qty: 15 G | Refills: 0 | Status: SHIPPED | OUTPATIENT
Start: 2022-11-21 | End: 2022-11-22

## 2022-11-21 ASSESSMENT — ENCOUNTER SYMPTOMS
RHINORRHEA: 0
NAUSEA: 0
ABDOMINAL PAIN: 0
BACK PAIN: 0
VOMITING: 0
WHEEZING: 0
SHORTNESS OF BREATH: 0

## 2022-11-21 ASSESSMENT — PATIENT HEALTH QUESTIONNAIRE - PHQ9
SUM OF ALL RESPONSES TO PHQ QUESTIONS 1-9: 0
SUM OF ALL RESPONSES TO PHQ9 QUESTIONS 1 & 2: 0
2. FEELING DOWN, DEPRESSED OR HOPELESS: 0
SUM OF ALL RESPONSES TO PHQ QUESTIONS 1-9: 0
1. LITTLE INTEREST OR PLEASURE IN DOING THINGS: 0

## 2022-11-21 NOTE — PROGRESS NOTES
2022    Alvin Sage (:  1990) is a 28 y.o. male, here for a preventive medicine evaluation. Patient Active Problem List   Diagnosis    Epigastric hernia    Abscess of postoperative wound of abdominal wall     Patient is here to establish care. He is here for a physical    He has noted some red inflammation on his penis two weeks ago. It seems some better. He was last sexually active six months ago. It was unprotected. No symptoms after he had intercourse. No open sores on his penis. He has noted some anal itching. He is heterosexual.     He has a remote history of asthma. He is single. Review of Systems   Constitutional:  Negative for activity change and appetite change. HENT:  Negative for postnasal drip and rhinorrhea. Respiratory:  Negative for shortness of breath and wheezing. Cardiovascular:  Negative for chest pain, palpitations and leg swelling. Gastrointestinal:  Negative for abdominal pain, nausea and vomiting. Genitourinary:  Negative for difficulty urinating and frequency. Musculoskeletal:  Negative for back pain and joint swelling. Skin:  Negative for rash. Neurological:  Negative for light-headedness. Psychiatric/Behavioral:  Negative for sleep disturbance. Prior to Visit Medications    Medication Sig Taking? Authorizing Provider   clotrimazole-betamethasone (LOTRISONE) 1-0.05 % cream Apply topically 2 times daily.  Yes Mare Hutton MD   ondansetron (ZOFRAN ODT) 4 MG disintegrating tablet Take 1 tablet by mouth every 8 hours as needed for Nausea  Kristi Johnson MD        No Known Allergies    Past Medical History:   Diagnosis Date    Asthma     as child; no problems in years       Past Surgical History:   Procedure Laterality Date    UMBILICAL HERNIA REPAIR N/A 2022    ABDOMINAL WALL EXPLORATION performed by Adelbert Duane, MD at [address] [place] [address]. History     Socioeconomic History Marital status: Single     Spouse name: Not on file    Number of children: Not on file    Years of education: Not on file    Highest education level: Not on file   Occupational History    Not on file   Tobacco Use    Smoking status: Every Day     Packs/day: 1.00     Years: 14.00     Pack years: 14.00     Types: Cigarettes    Smokeless tobacco: Former     Types: Chew    Tobacco comments:     Nicotine pouch to help wean off tobacco   Vaping Use    Vaping Use: Never used   Substance and Sexual Activity    Alcohol use: Yes     Comment: 6 drinks per week    Drug use: Never    Sexual activity: Not Currently     Partners: Female   Other Topics Concern    Not on file   Social History Narrative    Not on file     Social Determinants of Health     Financial Resource Strain: Not on file   Food Insecurity: Not on file   Transportation Needs: Not on file   Physical Activity: Not on file   Stress: Not on file   Social Connections: Not on file   Intimate Partner Violence: Not on file   Housing Stability: Not on file        Family History   Problem Relation Age of Onset    No Known Problems Mother     No Known Problems Father     High Blood Pressure Brother        ADVANCE DIRECTIVE: N, <no information>    Vitals:    11/21/22 1300   Weight: 162 lb (73.5 kg)   Height: 5' 10\" (1.778 m)     Estimated body mass index is 23.24 kg/m² as calculated from the following:    Height as of this encounter: 5' 10\" (1.778 m). Weight as of this encounter: 162 lb (73.5 kg). Physical Exam  Constitutional:       Appearance: He is well-developed. HENT:      Head: Normocephalic. Eyes:      Conjunctiva/sclera: Conjunctivae normal.      Pupils: Pupils are equal, round, and reactive to light. Neck:      Thyroid: No thyroid mass or thyromegaly. Vascular: No carotid bruit or JVD. Trachea: Trachea normal.   Cardiovascular:      Rate and Rhythm: Normal rate and regular rhythm. Heart sounds: Normal heart sounds. No murmur heard.     No gallop. Pulmonary:      Effort: Pulmonary effort is normal. No respiratory distress. Breath sounds: Normal breath sounds. No wheezing or rales. Abdominal:      General: Bowel sounds are normal. There is no distension. Palpations: Abdomen is soft. There is no hepatomegaly, splenomegaly or mass. Tenderness: There is no abdominal tenderness. Genitourinary:     Comments: Rash on the shaft of the penis raised border with some inflammation  Musculoskeletal:         General: Normal range of motion. Cervical back: Normal range of motion and neck supple. Lymphadenopathy:      Cervical: No cervical adenopathy. Skin:     General: Skin is warm and dry. Findings: No rash. Neurological:      Mental Status: He is alert and oriented to person, place, and time. Cranial Nerves: No cranial nerve deficit. Deep Tendon Reflexes: Reflexes are normal and symmetric. Psychiatric:         Behavior: Behavior normal.         Thought Content: Thought content normal.         Judgment: Judgment normal.           No flowsheet data found. Lab Results   Component Value Date/Time    GLUCOSE 96 10/08/2022 07:28 AM       The ASCVD Risk score (Hansel DK, et al., 2019) failed to calculate for the following reasons: The 2019 ASCVD risk score is only valid for ages 36 to 78      There is no immunization history on file for this patient.     Health Maintenance   Topic Date Due    COVID-19 Vaccine (1) Never done    Varicella vaccine (1 of 2 - 2-dose childhood series) Never done    Pneumococcal 0-64 years Vaccine (1 - PCV) Never done    Depression Screen  Never done    HIV screen  Never done    Hepatitis C screen  Never done    DTaP/Tdap/Td vaccine (1 - Tdap) Never done    Flu vaccine (1) 08/01/2022    Hepatitis A vaccine  Aged Out    Hib vaccine  Aged Out    Meningococcal (ACWY) vaccine  Aged Out       Assessment & Plan   Routine general medical examination at a health care facility  -     Comprehensive Metabolic Panel; Future  -     CBC with Auto Differential; Future  Penile rash  No follow-ups on file. - annual exam done  - Penile rash ? Fungal. See dermatology. Try Lotrisone.      --Stacie Ham MD

## 2022-11-22 ENCOUNTER — OFFICE VISIT (OUTPATIENT)
Dept: DERMATOLOGY | Age: 32
End: 2022-11-22
Payer: COMMERCIAL

## 2022-11-22 DIAGNOSIS — R21 RASH AND OTHER NONSPECIFIC SKIN ERUPTION: Primary | ICD-10-CM

## 2022-11-22 DIAGNOSIS — D29.4 ANGIOKERATOMA OF SCROTUM: ICD-10-CM

## 2022-11-22 LAB
A/G RATIO: 1.9 (ref 1.1–2.2)
ALBUMIN SERPL-MCNC: 4.7 G/DL (ref 3.4–5)
ALP BLD-CCNC: 79 U/L (ref 40–129)
ALT SERPL-CCNC: 15 U/L (ref 10–40)
ANION GAP SERPL CALCULATED.3IONS-SCNC: 17 MMOL/L (ref 3–16)
AST SERPL-CCNC: 17 U/L (ref 15–37)
BASOPHILS ABSOLUTE: 0 K/UL (ref 0–0.2)
BASOPHILS RELATIVE PERCENT: 0.4 %
BILIRUB SERPL-MCNC: 0.6 MG/DL (ref 0–1)
BUN BLDV-MCNC: 14 MG/DL (ref 7–20)
CALCIUM SERPL-MCNC: 9.6 MG/DL (ref 8.3–10.6)
CHLORIDE BLD-SCNC: 99 MMOL/L (ref 99–110)
CO2: 24 MMOL/L (ref 21–32)
CREAT SERPL-MCNC: 0.8 MG/DL (ref 0.9–1.3)
EOSINOPHILS ABSOLUTE: 0.1 K/UL (ref 0–0.6)
EOSINOPHILS RELATIVE PERCENT: 1.3 %
GFR SERPL CREATININE-BSD FRML MDRD: >60 ML/MIN/{1.73_M2}
GLUCOSE BLD-MCNC: 97 MG/DL (ref 70–99)
HCT VFR BLD CALC: 45.9 % (ref 40.5–52.5)
HEMOGLOBIN: 15.4 G/DL (ref 13.5–17.5)
LYMPHOCYTES ABSOLUTE: 2 K/UL (ref 1–5.1)
LYMPHOCYTES RELATIVE PERCENT: 28.7 %
MCH RBC QN AUTO: 31.3 PG (ref 26–34)
MCHC RBC AUTO-ENTMCNC: 33.6 G/DL (ref 31–36)
MCV RBC AUTO: 93.2 FL (ref 80–100)
MONOCYTES ABSOLUTE: 0.4 K/UL (ref 0–1.3)
MONOCYTES RELATIVE PERCENT: 5.9 %
NEUTROPHILS ABSOLUTE: 4.5 K/UL (ref 1.7–7.7)
NEUTROPHILS RELATIVE PERCENT: 63.7 %
PDW BLD-RTO: 14.2 % (ref 12.4–15.4)
PLATELET # BLD: 190 K/UL (ref 135–450)
PMV BLD AUTO: 8.8 FL (ref 5–10.5)
POTASSIUM SERPL-SCNC: 4.1 MMOL/L (ref 3.5–5.1)
RBC # BLD: 4.92 M/UL (ref 4.2–5.9)
SODIUM BLD-SCNC: 140 MMOL/L (ref 136–145)
TOTAL PROTEIN: 7.2 G/DL (ref 6.4–8.2)
WBC # BLD: 7.1 K/UL (ref 4–11)

## 2022-11-22 PROCEDURE — 99204 OFFICE O/P NEW MOD 45 MIN: CPT | Performed by: INTERNAL MEDICINE

## 2022-11-22 NOTE — PATIENT INSTRUCTIONS
Thank you for visiting [address] [place] [address] Dermatology today!  Please follow the instructions below as we discussed in clinic:      Stop Dial soap or Old spice soap    Start hydrocortisone ointment twice a day to red areas for up to 2 weeks per month    Medications: Corticosteroid; Generic  Fougera   Betamethasone Valerate Lotion (0.1%)   Betamethasone Valerate Lotion (0.1%)   Triamcinolone Acetonide Ointment (0.025% and 0.1%)   Clobetasol Propionate Topical Solution (0.05%)   Betamethasone Dipropionate Ointment (0.05%)   Betamethasone Dipropionate Lotion (0.05%)      Perrigo   Mometasone Furoate Topical Solution (0.1%)   Desonide Ointment [0.05%]   Triamcinolone Acetonide Ointment (0.025%, 0.1% and 0.5%)   Betamethasone Dipropionate Lotion    Fluocinolone Acetonide Topical Scalp Oil, 0.01%      Taro   Desoximetasone Ointment (0.05%)   Hydrocortisone Butyrate Cream (0.1%)   Hydrocortisone Butyrate Ointment (0.1%)   Hydrocortisone Butyrate Solution (0.1%)   Desoximetasone Gel [0.05%]   Desoximetasone Ointment [0.25%]   Desonide Ointment (0.05%)   Oralone Triamcinolone Acetonide Dental Paste (0.1%)   Triamcinolone Acetonide Ointment (0.1%)   Triamcinolone Acetonide Dental Paste [0.1%]   Clobetasol Propionate Topical Solution       Teva   Fluocinonide Ointment [0.05]   Beta-Opal Lotion (0.1%)     Medications: Miscellaneous, Rx  Fougera  Tacrolimus 0.03% Ointment      Perrigo  Tacrolimus Ointment 0.03%      Protopic  0.03% or 0.1% Ointment     Antiperspirants\Deodorants  Alaway  Deoderant      Certain Dri  Prescription Strength Clinical Roll-On Antiperspirant      Cleure   Roll-On or Spray Deodorant, Aluminum Free   Stick Deodorant, Crystal      Crystal   Body Deodorant Stick   Roll-On Body Deodorant, Unscented      Kiss My Face Liquid Hexion [employer]s on DRAMMEN, Fragrance Free      Haroon  [facility] Advanced Invisible Roll-On Antiperspirant & Deodorant, Unscented      Sure  Antiperspirant & Deodorant Aerosol, Unscented Vanicream   Antiperspirant/Deodorant   Deodorant     Hair Care: Conditioners  Cleure  Replenishing Conditioner      DHS   DHS Conditioning Rinse With Panthenol   DHS Color Safe Rinse With Panthenol      No-Nothing  Fragrance Free Very Sensitive Moisture Conditioner      TrueCider  Creamy Conditioner      VMV Hypoallergenics  Essence Skin-Saving Milk Conditioner     Hair Care: Shampoos  Jayde's Tallow Treasures  Unscented Shampoo Soap      Cleure  Shampoo, Hydrating & Volumizing      CLn   Cln Healthy Scalp Shampoo   Moisture Rich Gentle Shampoo    Cln 2-in-1 Gentle Wash and Shampoo      Hobart Garden Soaps  Fragrance Free Solid Shampoo Bar      J.RAbdoul Melendez's  Moisturizing Formula Shampoo Bar      [place]  Shampoo Bar, Avocado Oil, Fragrance-Free      Skinny & Co.  Clarifying Raw Shampoo Bar      The Soap Opera  Beekman Goat Milk Fragrance Free Shampoo Bar      TrueCider  True Cider Shampoo and Body Wash      VMV Hypoallergenics   Essence Skin-Saving Lucius Wash Hair + Body \"Big Softie\" Shampoo   Essence Skin-Saving Superwash Hair + Body Milk Shampoo      Whiff   Unscented Shampoo Bar     Hair Care: Shampoos, Dry  Dove  Care Between Washes Unscented Dry Shampoo      No-Nothing  Sensitive Dry Shampoo, Fragrance Free      Not Your Mother's  Clean Freak Unscented Dry Shampoo     Hair Care: Stylers and Treatments  Biolage by Matrix  R.A.W.  Texturizing Styling Hair Spray (d)      Cleure   Hair Styling Gel, Medium Hold   Hair Spray, Firm Hold      Clinique  Hair Care Non-Aerosol Hairspray, Gentle Hold      Free & Clear  Styling and Finishing Hair Spray, Soft Hold (d)      No-Nothing   Fragrance Free Very Sensitive And Super Strong Hairspray   Fragrance Free Very Sensitive And Strong Hairspray      Vanicream   Hair Gel, For Sensitive Skin   Hair Spray, Firm Hold     Skin Care: Hand   Babyganics  Alcohol-Free Foaming Hand , Fragrance Free      Kiss My Face  Moisturizing Hand  with Alcohol La Roche-Posay   Gel Hydro-Alcoolique Purifying Hand Gel   Alcohol Antiseptic Hand       VMV Hypoallergenics  Grandma Shikha's Kid Gloves Monolaurin Moisturizing \"Make-It-\" Hand Gel      Whole Foods 365  Refreshing Gel Hand , Fragrance Free     Skin Care: Moisturizers  AmLactin   Daily Moisturizing Body Lotion   Rapid Relief Restoring Lotion      Aveeno   Baby Eczema Therapy Moisturizing Cream   Eczema Therapy Daily Moisturizing Cream   Eczema Therapy Hand And Face Cream (d)   Daily Moisturizing Sheer Hydration Lotion      Medtronic Sleep Potion Night Cream      CeraVe   Moisturizing Cream   Daily Moisturizing Lotion   P.M.  Facial Moisturizing Lotion   SA Lotion For Rough Bumpy Skin   Therapeutic Hand Cream   SA Cream For Rough Bumpy Skin   Healing Ointment   Baby Moisturizing Lotion   Baby Moisturizing Cream   Psoriasis Moisturizing Cream   Baby Healing Ointment      Cetaphil   Daily Hydrating Lotion with Hyaluronic Acid   Rich Hydrating Cream   Intensive Moisturizing Cream   Cracked Skin Repair Lotion      Cleure  Oil Free Facial Lotion for Sensitive Skin      Dove   DermaSeries Eczema Relief Body Lotion   Baby Dove Sensitive Moisture Lotion   DermaSeries Dry Skin Relief Body Cream      Elta MD   Moisturizer, Intense Moisturizer   Face, Hands & Body Lotion   AM Therapy Facial Moisturizer   So Silky Hand Crème      Lubriderm   Advanced Therapy Fragrance-Free Lotion   Advanced Therapy Fragrance-Free Lotion   Daily Moisture Lotion, Fragrance Free      Neutrogena  [place] Formula Hand Cream, Fragrance Free      Henry's  Coconut Oil Formula Body Oil      TrueCider  Face & Body Serum      Vanicream   Daily Facial Moisturizer For Sensitive Skin   Moisturizing Ointment      Vaseline   Intensive Care Advanced Repair Unscented Lotion   Vaseline Original Healing Jelly      VMV Hypoallergenics   Grandma Shikha's The Sade eduarda, [place] Johnson-Johnson [place]   Grandma Shikha's Mommycoddling All-Over Lotion Red Better Calm-The-Heck-Down Empire Steroid-Free Salve   Essence Hand + Body Smoother Lotion     Facial Moisturizers with SPF  Aveeno  Daily Moisturizing Lotion With Sunscreen SPF 15      CeraVe   A.M.  Facial Moisturizing Lotion SPF 30   Skin Renewing Day Cream SPF 30      Cetaphil  DermaControl Oil Absorbing Moisturizer SPF 30      Dove  DermaSeries Dry Skin Relief Face Cream SPF 15      Eucerin  Redness Relief Day Lotion Broad Spectrum SPF 15      La Roche-Posay  Toleriane Double Repair Face Moisturizer SPF 30      Neutrogena  Hydro Boost Hyaluronic Acid Moisturizer SPF 50      Olay   Regenerist Hydrating Moisturizer with Mineral SPF 15 (Fragrance Free)   Regenerist Hydrating Moisturizer with Mineral SPF 30 (Fragrance Free)     Skin Care: Soaps\Cleansers  AB  Skincare Cleanser      CeraVe   Hydrating Facial Cleanser   Eczema Body Wash (d)   Soothing Body Wash      Cetaphil  Gentle Skin Cleanser      Cleure   Glycerine Face/Body SLS Free Bar, Unscented   Pure Clarifying Face & Body The First American   All About Clean Foaming Facial Soap   All About Clean Rinse-Off Foaming Cleanser      CLn  Moisture Balancing Facial Cleanser      Dove  DermaSeries Dry Skin Relief Face Wash      Free & Clear  Liquid Cleanser      Kiss My Face  Olive Oil Bar Soap, Fragrance Free      Neutrogena  Transparent Facial Bar Fragrance-Free      Renzo's of [facility]  Fragrance Free Sensitive Beauty Bar with Aloe Vera (      TrueCider  Gentle Creamy Cleanser      VMV Hypoallergenics  Moisture Rich Creammmy Cleansing Milk For Dry Skin       Skin Care: Sunscreens  Banana Boat   Kids Tear-Free Sunscreen Lotion SPF 50 (d)   Simply Protect Baby Tear-Free Mineral-Based Sunscreen Lotion SPF 50+ (d)   Sport Mineral Enriched Sunscreen Spray SPF 50+      Cleure  Sunscreen SPF 30 for Sensitive Skin      125 Rula Min Broad Spectrum SPF 50 Daily Energy + Face Protector      Coppertone  Kids Tear Free Sunscreen Lotion SPF 48      Elta MD UV AERO Full-Body Sunscreen spray SPF 39   UV Elements Tinted Facial Sunscreen Broad Spectrum SPF 44   UV Replenish Facial Sunscreen Broad Spectrum SPF 40      Solbar  Thirty SPF 30     Household Products: Dishwashing  7th Generation   Powerful Clean  Detergent Pacs, Free & Clear   Powerful Clean  Detergent Powder, Free & Clear      Biokleen  Free & Clear Automatic [facility] Pods      Whole Foods 365   Detergent, Packs, unscented     Household Products: Laundry Detergents  7th Generation  Laundry Detergent Packs, Free & Clear      All  Free Clear Detergent Powder      Dreft  Family Freindly Liquid Detergent, Unscented      Tide  Ultra Tide Free & Gentle Powdered Detergent      Whole Foods   Organic Laundry Detergent, Unscented   Organic Baby Laundry Detergent Liquid, Unscented      Whole Foods 365   Powdered Laundry Detergent, Unscented   Baby Laundry Detergent Liquid, unscented     Household Products: Laundry Fabric Softener\Bleach\Additives  7th Generation   Chlorine Free HASKAYNE, Free & Clear   Fabric Softener Sheets, Free & Clear      Biokleen  Fragrance Free Dryer Sheets      Clorox  Gentle Free & Clear Bleach      Whole Foods 365  Fabric Softening Dryer Sheets, unscented     Shaving  Juan   [place] 3 Hydrating Shave Gel Razor Cartridges   Hydro 5 Hydrating Shave Gel Razor Cartridges      Vanicream  Shave Cream for Sensitive Skin      VMV Hypoallergenics   1635 Smoothening Aftershave Solution   1635 Pre-Shave Stacy Oil For All Skin Types     Wipes  Pampers   Sensitive Baby Wipes   Aqua Pure Wipes

## 2022-11-22 NOTE — PROGRESS NOTES
[facility] Dermatology  Dona Infante MD  [phone]    Date of Visit: 11/22/2022    Romel Cochran is a 28 y.o. male who presents for groin rash. New pt    Chief Complaint:   Chief Complaint   Patient presents with    Rash     Groin area        History of Present Illness:    Concern:  Groin rash  Duration:  2 weeks  Symptoms: Itchy  Previous treatments:    -Lotrisone cream--didn't start yet   Current treatments: None  Effect of current treatment: Not controlled  Other hx: Had hernia surgery and was told to start using Dial soap 1 month ago--still using. Otherwise uses Old Spice body wash    *Personal history of skin cancer: None  *Family history of skin cancer: None     Review of Systems:  Gen: Feels well, good sense of health. Skin: No new or changing moles, no history of keloids or hypertrophic scars. Past Medical History, Family History, Surgical History, Medications and Allergies reviewed. Past Medical History:   Diagnosis Date    Asthma     as child; no problems in years     Past Surgical History:   Procedure Laterality Date    UMBILICAL HERNIA REPAIR N/A 9/29/2022    ABDOMINAL WALL EXPLORATION performed by Aj Vyas MD at [facility]         No Known Allergies  Outpatient Medications Marked as Taking for the 11/22/22 encounter (Office Visit) with Monica Osorio MD   Medication Sig Dispense Refill    hydrocortisone 2.5 % ointment Apply to affected rash on penile area twice daily for up to 2 weeks per month 28.35 g 1    ondansetron (ZOFRAN ODT) 4 MG disintegrating tablet Take 1 tablet by mouth every 8 hours as needed for Nausea 20 tablet 0       Social History:       Physical Examination   No acute distress. Mood clear/affect appropriate. Alert and oriented. Mucous membranes moist.  Sclera anicteric.   Waist down skin exam was conducted to include the scalp, face, lips, lids/conjunctiva, ears, neck,  right and left hands, lower abdomen, b/l LE and genital area was normal with the following exceptions:    -Pink, non scaly patches on lateral aspect of penile shaft bilaterally. No papules or eruption on pubic mons, thighs, scrotum  -Erythematous papules on scrotum    Assessment and Plan     1. Rash and other nonspecific skin eruption, penile shaft--not controlled  -Favor ACD to new dial soap vs. ICD component. Does not clinically look like fungus or other eruption today  -Recommend:  -Start bland wash from list provided, stop dial soap  - Start hydrocortisone 2.5 % ointment; Apply to affected rash on penile area twice daily for up to 2 weeks per month  Dispense: 28.35 g; Refill: 1  -Keep area dry/free of sweat. Can use Zeasorb AF powder OTC    2. Angiokeratoma of scrotum  -Benign, reassurance       Return in about 6 weeks (around 1/3/2023). Note is transcribed using voice recognition software. Inadvertent computerized transcription errors may be present.     Daniel Cordero MD

## 2023-02-10 ENCOUNTER — TELEPHONE (OUTPATIENT)
Dept: INTERNAL MEDICINE CLINIC | Age: 33
End: 2023-02-10

## 2023-02-10 NOTE — TELEPHONE ENCOUNTER
----- Message from [address] [place] [address] sent at 2/10/2023 12:10 PM EST -----  Contact: Patient [phone]    ----- Message -----  From: JANETH Zepeda CNP  Sent: 2/10/2023  10:57 AM EST  To: [address] [place] [address]    I recommend Tylenol, Ibuprofen, Ice.     ----- Message -----  From: [address] [place] [address]  Sent: 2/10/2023   8:22 AM EST  To: JANETH Zepeda CNP    Pt is requesting medication for pain. Pt has laceration on L thumb, had a hard time sleeping because of the pain. Has future appt and had went to ER. Please advise.       Joseph Greenfield [id number] - [id number], [address] [place] [address]   Phone:  [phone]  Fax:  [phone]

## 2023-02-17 ENCOUNTER — OFFICE VISIT (OUTPATIENT)
Dept: INTERNAL MEDICINE CLINIC | Age: 33
End: 2023-02-17

## 2023-02-17 DIAGNOSIS — Z48.02 ENCOUNTER FOR REMOVAL OF SUTURES: Primary | ICD-10-CM

## 2023-02-17 PROCEDURE — 99212 OFFICE O/P EST SF 10 MIN: CPT

## 2023-02-17 NOTE — PROGRESS NOTES
Progress Note      Subjective:  Mr. Guillermina Benítez today is coming in for suture removal of his left thumb. Physical Exam:    Gen: No distress. Alert. Eyes: PERRL. No sclera icterus. No conjunctival injection. Neck: No JVD. Trachea midline. Skin: Warm and dry. No nodule on exposed extremities. No rash on exposed extremities. Left thumb with 3 sutures, no surrounding erythema, edema, or purulent discharge. Appears to be healing well   M/S: No cyanosis. No joint deformity. No clubbing. Neuro: Awake. Grossly nonfocal    Psych: Oriented x 3. No anxiety or agitation.            Assessment/Plan:  #Suture removal   -patient tolerated procedure well, all 3 sutures were removed with ease  -wound bandaged  -after care instructions given     Merlin Comment, PA  2/17/23  2:46 PM

## 2023-03-02 ENCOUNTER — OFFICE VISIT (OUTPATIENT)
Dept: INTERNAL MEDICINE CLINIC | Age: 33
End: 2023-03-02

## 2023-03-02 VITALS
DIASTOLIC BLOOD PRESSURE: 80 MMHG | HEART RATE: 80 BPM | HEIGHT: 70 IN | SYSTOLIC BLOOD PRESSURE: 110 MMHG | WEIGHT: 160 LBS | RESPIRATION RATE: 12 BRPM | BODY MASS INDEX: 22.9 KG/M2

## 2023-03-02 DIAGNOSIS — S69.92XD INJURY OF LEFT THUMB, SUBSEQUENT ENCOUNTER: Primary | ICD-10-CM

## 2023-03-02 PROCEDURE — 99212 OFFICE O/P EST SF 10 MIN: CPT | Performed by: INTERNAL MEDICINE

## 2023-03-02 NOTE — PROGRESS NOTES
Subjective:      Patient ID: Zofia Paula is a 28 y.o. male. HPI    He had a left thumb injury 3 weeks ago. He had stitches removed and has some numbness and discoloration at the tip of the left thumb. Review of Systems  See hpi    There are no changes to past medical history, family history, social history or review of systems(except as noted in the history section) since prior note (all reviewed with patient). Current Outpatient Medications   Medication Instructions    ondansetron (ZOFRAN ODT) 4 mg, Oral, EVERY 8 HOURS PRN       /80 (Site: Left Upper Arm, Position: Sitting, Cuff Size: Medium Adult)   Pulse 80   Resp 12   Ht 5' 10\" (1.778 m)   Wt 160 lb (72.6 kg)   BMI 22.96 kg/m²         Objective:   Physical Exam    Tenderness of the tip of the left thumb. Assessment:       Diagnosis Orders   1. Injury of left thumb, subsequent encounter               Plan:      Refer to hand surgeon.         Latia Carty MD

## 2024-01-17 ENCOUNTER — TELEPHONE (OUTPATIENT)
Dept: INTERNAL MEDICINE CLINIC | Age: 34
End: 2024-01-17

## 2024-01-17 NOTE — TELEPHONE ENCOUNTER
----- Message from Maddi Larios MA sent at 1/17/2024 12:40 PM EST -----  Contact: [phone]  Patient states no he hasn't had flu shot and he hasn't had a fever.   ----- Message -----  From: Temitope Shannon MD  Sent: 1/17/2024  12:27 PM EST  To: Maddi Larios MA    He may have the flu. Did he get the flu shot. Is he running a fever  ----- Message -----  From: Adele Bolse MA  Sent: 1/17/2024   8:09 AM EST  To: Temitope Shannon MD    Patient called and is complaining of sinus drainage, sore throat, head congestion, sinus pressure and pressure in the R ear. He also states that he has had on and off body aches. This all started yesterday morning, 1/16. He is hoping you can call something in for him to help him knock this out.       Pharmacy:     [facility] in [place]

## 2025-04-10 ENCOUNTER — OFFICE VISIT (OUTPATIENT)
Dept: INTERNAL MEDICINE CLINIC | Age: 35
End: 2025-04-10

## 2025-04-10 ENCOUNTER — HOSPITAL ENCOUNTER (OUTPATIENT)
Dept: GENERAL RADIOLOGY | Age: 35
Discharge: HOME | End: 2025-04-10
Payer: COMMERCIAL

## 2025-04-10 ENCOUNTER — RESULTS FOLLOW-UP (OUTPATIENT)
Dept: INTERNAL MEDICINE CLINIC | Age: 35
End: 2025-04-10

## 2025-04-10 VITALS
HEART RATE: 96 BPM | SYSTOLIC BLOOD PRESSURE: 122 MMHG | BODY MASS INDEX: 26.05 KG/M2 | HEIGHT: 70 IN | DIASTOLIC BLOOD PRESSURE: 84 MMHG | RESPIRATION RATE: 14 BRPM | WEIGHT: 182 LBS

## 2025-04-10 DIAGNOSIS — M54.40 LOW BACK PAIN WITH SCIATICA, SCIATICA LATERALITY UNSPECIFIED, UNSPECIFIED BACK PAIN LATERALITY, UNSPECIFIED CHRONICITY: ICD-10-CM

## 2025-04-10 DIAGNOSIS — M51.26 LUMBAR DISC HERNIATION: ICD-10-CM

## 2025-04-10 DIAGNOSIS — M48.061 SPINAL STENOSIS OF LUMBAR REGION WITHOUT NEUROGENIC CLAUDICATION: ICD-10-CM

## 2025-04-10 DIAGNOSIS — Z01.818 PRE-OP EXAM: ICD-10-CM

## 2025-04-10 DIAGNOSIS — M54.16 RADICULOPATHY OF LUMBAR REGION: ICD-10-CM

## 2025-04-10 DIAGNOSIS — R29.898 RIGHT LEG WEAKNESS: ICD-10-CM

## 2025-04-10 DIAGNOSIS — Z01.818 PRE-OP EXAM: Primary | ICD-10-CM

## 2025-04-10 LAB
APTT BLD: 27.6 SEC (ref 22.1–36.4)
EKG ATRIAL RATE: 89 BPM
EKG DIAGNOSIS: NORMAL
EKG P AXIS: 61 DEGREES
EKG P-R INTERVAL: 148 MS
EKG Q-T INTERVAL: 358 MS
EKG QRS DURATION: 94 MS
EKG QTC CALCULATION (BAZETT): 435 MS
EKG R AXIS: 69 DEGREES
EKG T AXIS: 45 DEGREES
EKG VENTRICULAR RATE: 89 BPM
INR PPP: 0.86 (ref 0.85–1.15)
PROTHROMBIN TIME: 12 SEC (ref 11.9–14.9)

## 2025-04-10 PROCEDURE — 93005 ELECTROCARDIOGRAM TRACING: CPT

## 2025-04-10 PROCEDURE — 99213 OFFICE O/P EST LOW 20 MIN: CPT | Performed by: NURSE PRACTITIONER

## 2025-04-10 PROCEDURE — 71046 X-RAY EXAM CHEST 2 VIEWS: CPT

## 2025-04-10 RX ORDER — HYDROCODONE BITARTRATE AND ACETAMINOPHEN 5; 325 MG/1; MG/1
1 TABLET ORAL EVERY 8 HOURS PRN
COMMUNITY

## 2025-04-10 ASSESSMENT — PATIENT HEALTH QUESTIONNAIRE - PHQ9
SUM OF ALL RESPONSES TO PHQ QUESTIONS 1-9: 1
SUM OF ALL RESPONSES TO PHQ QUESTIONS 1-9: 1
2. FEELING DOWN, DEPRESSED OR HOPELESS: NOT AT ALL
1. LITTLE INTEREST OR PLEASURE IN DOING THINGS: SEVERAL DAYS
SUM OF ALL RESPONSES TO PHQ QUESTIONS 1-9: 1
SUM OF ALL RESPONSES TO PHQ QUESTIONS 1-9: 1

## 2025-04-10 NOTE — PROGRESS NOTES
STANDARD (2 VW)    CBC with Auto Differential    Comprehensive Metabolic Panel    Protime-INR    APTT    Hemoglobin A1C    Culture, MRSA, Screening      5. Spinal stenosis of lumbar region without neurogenic claudication  EKG 12 lead    XR CHEST STANDARD (2 VW)    CBC with Auto Differential    Comprehensive Metabolic Panel    Protime-INR    APTT    Hemoglobin A1C    Culture, MRSA, Screening      6. Right leg weakness  EKG 12 lead    XR CHEST STANDARD (2 VW)    CBC with Auto Differential    Comprehensive Metabolic Panel    Protime-INR    APTT    Hemoglobin A1C    Culture, MRSA, Screening            34 y.o. male with planned surgery as above.         Plan:     Patient medically cleared for above planned procedure.    Lidia Perez FNP-C  4/10/2025

## 2025-04-11 ENCOUNTER — RESULTS FOLLOW-UP (OUTPATIENT)
Dept: INTERNAL MEDICINE CLINIC | Age: 35
End: 2025-04-11

## 2025-04-11 LAB
ALBUMIN SERPL-MCNC: 4.6 G/DL (ref 3.4–5)
ALBUMIN/GLOB SERPL: 1.8 {RATIO} (ref 1.1–2.2)
ALP SERPL-CCNC: 63 U/L (ref 40–129)
ALT SERPL-CCNC: 28 U/L (ref 10–40)
ANION GAP SERPL CALCULATED.3IONS-SCNC: 11 MMOL/L (ref 3–16)
AST SERPL-CCNC: 20 U/L (ref 15–37)
BASOPHILS # BLD: 0 K/UL (ref 0–0.2)
BASOPHILS NFR BLD: 0.6 %
BILIRUB SERPL-MCNC: <0.2 MG/DL (ref 0–1)
BUN SERPL-MCNC: 13 MG/DL (ref 7–20)
CALCIUM SERPL-MCNC: 9.3 MG/DL (ref 8.3–10.6)
CHLORIDE SERPL-SCNC: 98 MMOL/L (ref 99–110)
CO2 SERPL-SCNC: 27 MMOL/L (ref 21–32)
CREAT SERPL-MCNC: 1.2 MG/DL (ref 0.9–1.3)
DEPRECATED RDW RBC AUTO: 13.4 % (ref 12.4–15.4)
EOSINOPHIL # BLD: 0.1 K/UL (ref 0–0.6)
EOSINOPHIL NFR BLD: 2.2 %
EST. AVERAGE GLUCOSE BLD GHB EST-MCNC: 99.7 MG/DL
GFR SERPLBLD CREATININE-BSD FMLA CKD-EPI: 81 ML/MIN/{1.73_M2}
GLUCOSE SERPL-MCNC: 113 MG/DL (ref 70–99)
HBA1C MFR BLD: 5.1 %
HCT VFR BLD AUTO: 43.6 % (ref 40.5–52.5)
HGB BLD-MCNC: 15.3 G/DL (ref 13.5–17.5)
LYMPHOCYTES # BLD: 1.8 K/UL (ref 1–5.1)
LYMPHOCYTES NFR BLD: 30 %
MCH RBC QN AUTO: 32.2 PG (ref 26–34)
MCHC RBC AUTO-ENTMCNC: 35 G/DL (ref 31–36)
MCV RBC AUTO: 92 FL (ref 80–100)
MONOCYTES # BLD: 0.4 K/UL (ref 0–1.3)
MONOCYTES NFR BLD: 6.8 %
NEUTROPHILS # BLD: 3.7 K/UL (ref 1.7–7.7)
NEUTROPHILS NFR BLD: 60.4 %
PLATELET # BLD AUTO: 196 K/UL (ref 135–450)
PMV BLD AUTO: 9.6 FL (ref 5–10.5)
POTASSIUM SERPL-SCNC: 4.3 MMOL/L (ref 3.5–5.1)
PROT SERPL-MCNC: 7.2 G/DL (ref 6.4–8.2)
RBC # BLD AUTO: 4.74 M/UL (ref 4.2–5.9)
SODIUM SERPL-SCNC: 136 MMOL/L (ref 136–145)
WBC # BLD AUTO: 6.1 K/UL (ref 4–11)

## 2025-04-12 LAB
MRSA SPEC QL CULT: ABNORMAL
ORGANISM: ABNORMAL

## 2025-04-14 RX ORDER — MUPIROCIN 20 MG/G
OINTMENT TOPICAL
Qty: 1 G | Refills: 0 | Status: SHIPPED | OUTPATIENT
Start: 2025-04-14 | End: 2025-04-21

## (undated) DEVICE — SKIN AFFIX SURG ADHESIVE 72/CS 0.55ML: Brand: MEDLINE

## (undated) DEVICE — GLOVE,SURG,SENSICARE SLT,LF,PF,7.5: Brand: MEDLINE

## (undated) DEVICE — GOWN,REINF,POLY,AURORA,XLNG/XXL,STRL: Brand: MEDLINE

## (undated) DEVICE — CHLORAPREP 26ML ORANGE

## (undated) DEVICE — MINOR SET UP: Brand: MEDLINE INDUSTRIES, INC.

## (undated) DEVICE — SUTURE MCRYL + SZ 4-0 L18IN ABSRB UD L19MM PS-2 3/8 CIR MCP496G

## (undated) DEVICE — SUTURE VCRL + SZ 3-0 L18IN ABSRB UD SH 1/2 CIR TAPERCUT NDL VCP864D

## (undated) DEVICE — SOLUTION IV IRRIG POUR BRL 0.9% SODIUM CHL 2F7124

## (undated) DEVICE — SUTURE ETHBND EXCEL SZ 0 L18IN NONABSORBABLE GRN L22MM MO-7 CX41D

## (undated) DEVICE — SHEET, T, LAPAROTOMY, STERILE: Brand: MEDLINE